# Patient Record
Sex: FEMALE | Race: WHITE | NOT HISPANIC OR LATINO | Employment: UNEMPLOYED | URBAN - NONMETROPOLITAN AREA
[De-identification: names, ages, dates, MRNs, and addresses within clinical notes are randomized per-mention and may not be internally consistent; named-entity substitution may affect disease eponyms.]

---
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
## 2021-01-09 ENCOUNTER — APPOINTMENT (OUTPATIENT)
Dept: CT IMAGING | Facility: HOSPITAL | Age: 21
End: 2021-01-09

## 2021-01-09 ENCOUNTER — HOSPITAL ENCOUNTER (EMERGENCY)
Facility: HOSPITAL | Age: 21
Discharge: HOME | End: 2021-01-09
Attending: EMERGENCY MEDICINE | Admitting: EMERGENCY MEDICINE

## 2021-01-09 VITALS
WEIGHT: 140 LBS | HEIGHT: 62 IN | BODY MASS INDEX: 25.76 KG/M2 | RESPIRATION RATE: 18 BRPM | SYSTOLIC BLOOD PRESSURE: 109 MMHG | OXYGEN SATURATION: 98 % | DIASTOLIC BLOOD PRESSURE: 63 MMHG | HEART RATE: 105 BPM | TEMPERATURE: 98.5 F

## 2021-01-09 DIAGNOSIS — S16.1XXA STRAIN OF NECK MUSCLE, INITIAL ENCOUNTER: Primary | ICD-10-CM

## 2021-01-09 DIAGNOSIS — R51.9 SINUS HEADACHE: ICD-10-CM

## 2021-01-09 PROCEDURE — 70450 CT HEAD/BRAIN W/O DYE: CPT

## 2021-01-09 PROCEDURE — 72125 CT NECK SPINE W/O DYE: CPT

## 2021-01-09 PROCEDURE — 99283 EMERGENCY DEPT VISIT LOW MDM: CPT

## 2021-01-09 PROCEDURE — 63710000001 ONDANSETRON ODT 4 MG TABLET DISPERSIBLE: Performed by: EMERGENCY MEDICINE

## 2021-01-09 RX ORDER — FLUTICASONE PROPIONATE 50 MCG
2 SPRAY, SUSPENSION (ML) NASAL DAILY
Qty: 15.8 ML | Refills: 0 | Status: SHIPPED | OUTPATIENT
Start: 2021-01-09

## 2021-01-09 RX ORDER — ONDANSETRON 4 MG/1
4 TABLET, ORALLY DISINTEGRATING ORAL ONCE
Status: COMPLETED | OUTPATIENT
Start: 2021-01-09 | End: 2021-01-09

## 2021-01-09 RX ORDER — KETOROLAC TROMETHAMINE 10 MG/1
10 TABLET, FILM COATED ORAL EVERY 6 HOURS PRN
Status: DISCONTINUED | OUTPATIENT
Start: 2021-01-09 | End: 2021-01-09 | Stop reason: HOSPADM

## 2021-01-09 RX ORDER — LORAZEPAM 1 MG/1
1 TABLET ORAL ONCE
Status: COMPLETED | OUTPATIENT
Start: 2021-01-09 | End: 2021-01-09

## 2021-01-09 RX ADMIN — ONDANSETRON 4 MG: 4 TABLET, ORALLY DISINTEGRATING ORAL at 01:14

## 2021-01-09 RX ADMIN — LORAZEPAM 1 MG: 1 TABLET ORAL at 01:14

## 2021-01-09 NOTE — ED PROVIDER NOTES
Subjective   20-year-old female presents to the ED with complaint of headache and neck pain.  Onset of pain yesterday.  She describes neck pain that begins in the bilateral paraspinous lower cervical region and radiates up to the back of her head.  She also has pain on the left side of her forehead, throbbing in nature.  Associated nausea and photophobia.  Has not had similar headaches in the past.  She has nausea but no vomiting.  No change in her mental status, no fever, no cough congestion sore throat runny nose, no change in smell or taste sensation.  Symptoms moderate severity with no aggravating or alleviating factors.      History provided by:  Patient and caregiver      Review of Systems   All other systems reviewed and are negative.      No past medical history on file.    No Known Allergies    No past surgical history on file.    No family history on file.    Social History     Socioeconomic History   • Marital status: Single     Spouse name: Not on file   • Number of children: Not on file   • Years of education: Not on file   • Highest education level: Not on file           Objective   Physical Exam  Vitals signs and nursing note reviewed.   Constitutional:       General: She is not in acute distress.     Appearance: Normal appearance. She is normal weight.   HENT:      Head: Normocephalic and atraumatic.      Nose: Nose normal.      Mouth/Throat:      Mouth: Mucous membranes are moist.      Pharynx: Oropharynx is clear. No oropharyngeal exudate or posterior oropharyngeal erythema.   Eyes:      Extraocular Movements: Extraocular movements intact.      Conjunctiva/sclera: Conjunctivae normal.      Pupils: Pupils are equal, round, and reactive to light.   Neck:      Musculoskeletal: Normal range of motion and neck supple. No neck rigidity or muscular tenderness.   Cardiovascular:      Rate and Rhythm: Normal rate and regular rhythm.      Pulses: Normal pulses.      Heart sounds: Normal heart sounds.    Pulmonary:      Effort: Pulmonary effort is normal.      Breath sounds: Normal breath sounds. No wheezing, rhonchi or rales.   Abdominal:      General: Abdomen is flat. Bowel sounds are normal. There is no distension.      Palpations: Abdomen is soft.      Tenderness: There is no abdominal tenderness.   Musculoskeletal: Normal range of motion.         General: No tenderness.      Right lower leg: No edema.      Left lower leg: No edema.   Skin:     General: Skin is warm and dry.      Capillary Refill: Capillary refill takes less than 2 seconds.      Findings: No rash.   Neurological:      General: No focal deficit present.      Mental Status: She is alert and oriented to person, place, and time. Mental status is at baseline.      Cranial Nerves: No cranial nerve deficit.      Sensory: No sensory deficit.      Motor: No weakness.   Psychiatric:         Mood and Affect: Mood normal.         Behavior: Behavior normal.         Thought Content: Thought content normal.         Procedures           ED Course  ED Course as of Jan 09 0331   Sat Jan 09, 2021   0328 Patient is feeling better.  CT head shows left frontal sinusitis which explains location of the patient's left frontal headache.  Her occipital headache and neck pain are likely secondary to musculoskeletal strain.  S per mom, looking down at her computer and phone for hours and hours a day.  Recommend she adjust her screen height to eye level, can take over-the-counter NSAIDs.    [AW]      ED Course User Index  [AW] Segun Harvey MD                                           [facility]    Final diagnoses:   Strain of neck muscle, initial encounter   Sinus headache            Segun Harvey MD  01/09/21 5103

## 2021-01-09 NOTE — ED NOTES
Patient presents to the ED with her legal guardian and the CC of nausea and headache X2 days. Patient was given 200 mg of motrin at 2300 on 1/8/21 without relief. She also c/o of neck pain, but states it has been going on her entire life.      Sherine Falcon, RN  01/09/21 0048

## 2021-01-13 ENCOUNTER — OFFICE VISIT (OUTPATIENT)
Age: 21
End: 2021-01-13

## 2021-01-13 VITALS — OXYGEN SATURATION: 99 % | TEMPERATURE: 98.4 F | HEART RATE: 107 BPM

## 2021-01-13 DIAGNOSIS — Z11.59 SCREENING FOR VIRAL DISEASE: Primary | ICD-10-CM

## 2021-01-13 PROCEDURE — 99999 PR OFFICE/OUTPT VISIT,PROCEDURE ONLY: CPT | Performed by: NURSE PRACTITIONER

## 2021-01-19 LAB — SARS-COV-2, NAA: NOT DETECTED

## 2021-01-31 ENCOUNTER — HOSPITAL ENCOUNTER (EMERGENCY)
Age: 21
Discharge: HOME | End: 2021-02-01
Attending: PEDIATRICS
Payer: OTHER GOVERNMENT

## 2021-01-31 DIAGNOSIS — G43.809 OTHER MIGRAINE WITHOUT STATUS MIGRAINOSUS, NOT INTRACTABLE: Primary | ICD-10-CM

## 2021-01-31 PROCEDURE — 96374 THER/PROPH/DIAG INJ IV PUSH: CPT

## 2021-01-31 PROCEDURE — 96375 TX/PRO/DX INJ NEW DRUG ADDON: CPT

## 2021-01-31 PROCEDURE — 99284 EMERGENCY DEPT VISIT MOD MDM: CPT

## 2021-02-01 VITALS
WEIGHT: 130 LBS | HEART RATE: 94 BPM | OXYGEN SATURATION: 99 % | HEIGHT: 61 IN | DIASTOLIC BLOOD PRESSURE: 79 MMHG | BODY MASS INDEX: 24.55 KG/M2 | TEMPERATURE: 97.9 F | RESPIRATION RATE: 16 BRPM | SYSTOLIC BLOOD PRESSURE: 107 MMHG

## 2021-02-01 LAB
ADENOVIRUS BY PCR: NOT DETECTED
BORDETELLA PARAPERTUSSIS BY PCR: NOT DETECTED
BORDETELLA PERTUSSIS BY PCR: NOT DETECTED
CHLAMYDOPHILIA PNEUMONIAE BY PCR: NOT DETECTED
CORONAVIRUS 229E BY PCR: NOT DETECTED
CORONAVIRUS HKU1 BY PCR: NOT DETECTED
CORONAVIRUS NL63 BY PCR: NOT DETECTED
CORONAVIRUS OC43 BY PCR: NOT DETECTED
HUMAN METAPNEUMOVIRUS BY PCR: NOT DETECTED
HUMAN RHINOVIRUS/ENTEROVIRUS BY PCR: NOT DETECTED
INFLUENZA A BY PCR: NOT DETECTED
INFLUENZA B BY PCR: NOT DETECTED
MYCOPLASMA PNEUMONIAE BY PCR: NOT DETECTED
PARAINFLUENZA VIRUS 1 BY PCR: NOT DETECTED
PARAINFLUENZA VIRUS 2 BY PCR: NOT DETECTED
PARAINFLUENZA VIRUS 3 BY PCR: NOT DETECTED
PARAINFLUENZA VIRUS 4 BY PCR: NOT DETECTED
RESPIRATORY SYNCYTIAL VIRUS BY PCR: NOT DETECTED
SARS-COV-2, PCR: NOT DETECTED

## 2021-02-01 PROCEDURE — 2580000003 HC RX 258: Performed by: PEDIATRICS

## 2021-02-01 PROCEDURE — 6360000002 HC RX W HCPCS: Performed by: PEDIATRICS

## 2021-02-01 PROCEDURE — 0202U NFCT DS 22 TRGT SARS-COV-2: CPT

## 2021-02-01 RX ORDER — DIPHENHYDRAMINE HYDROCHLORIDE 50 MG/ML
50 INJECTION INTRAMUSCULAR; INTRAVENOUS ONCE
Status: COMPLETED | OUTPATIENT
Start: 2021-02-01 | End: 2021-02-01

## 2021-02-01 RX ORDER — 0.9 % SODIUM CHLORIDE 0.9 %
1000 INTRAVENOUS SOLUTION INTRAVENOUS ONCE
Status: COMPLETED | OUTPATIENT
Start: 2021-02-01 | End: 2021-02-01

## 2021-02-01 RX ORDER — METOCLOPRAMIDE HYDROCHLORIDE 5 MG/ML
10 INJECTION INTRAMUSCULAR; INTRAVENOUS ONCE
Status: COMPLETED | OUTPATIENT
Start: 2021-02-01 | End: 2021-02-01

## 2021-02-01 RX ORDER — KETOROLAC TROMETHAMINE 30 MG/ML
15 INJECTION, SOLUTION INTRAMUSCULAR; INTRAVENOUS ONCE
Status: COMPLETED | OUTPATIENT
Start: 2021-02-01 | End: 2021-02-01

## 2021-02-01 RX ORDER — METOCLOPRAMIDE 10 MG/1
10 TABLET ORAL EVERY 6 HOURS PRN
Qty: 20 TABLET | Refills: 0 | Status: SHIPPED | OUTPATIENT
Start: 2021-02-01

## 2021-02-01 RX ADMIN — KETOROLAC TROMETHAMINE 15 MG: 30 INJECTION, SOLUTION INTRAMUSCULAR; INTRAVENOUS at 01:50

## 2021-02-01 RX ADMIN — SODIUM CHLORIDE 1000 ML: 9 INJECTION, SOLUTION INTRAVENOUS at 01:45

## 2021-02-01 RX ADMIN — METOCLOPRAMIDE 10 MG: 5 INJECTION, SOLUTION INTRAMUSCULAR; INTRAVENOUS at 01:54

## 2021-02-01 RX ADMIN — DIPHENHYDRAMINE HYDROCHLORIDE 50 MG: 50 INJECTION, SOLUTION INTRAMUSCULAR; INTRAVENOUS at 01:50

## 2021-02-01 ASSESSMENT — PAIN SCALES - GENERAL
PAINLEVEL_OUTOF10: 0
PAINLEVEL_OUTOF10: 7

## 2021-02-01 NOTE — ED NOTES
Pt states that she has been vomiting/nauseated for about a week now. Mom states that she has not been able to keep much food or water down. Labs sent at this time. Pt made aware that urine specimen will be needed.       Kai Loyola RN  01/31/21 7079

## 2021-02-02 ASSESSMENT — ENCOUNTER SYMPTOMS
SHORTNESS OF BREATH: 0
ABDOMINAL PAIN: 0
NAUSEA: 1
COUGH: 0
COLOR CHANGE: 0
BACK PAIN: 0
VOMITING: 0
RHINORRHEA: 1

## 2021-02-02 NOTE — ED PROVIDER NOTES
color change and pallor. Neurological: Positive for headaches. Negative for syncope. Psychiatric/Behavioral: Negative for agitation and confusion. All other systems reviewed and are negative. PAST MEDICALHISTORY     Past Medical History:   Diagnosis Date    ADD (attention deficit disorder)     ADHD     Bipolar 1 disorder ([facility] [address]          SURGICAL HISTORY     History reviewed. No pertinent surgical history. CURRENT MEDICATIONS     Discharge Medication List as of 2/1/2021  2:49 AM      CONTINUE these medications which have NOT CHANGED    Details   citalopram (CELEXA) 10 MG tablet Take 1 tablet by mouth daily, Disp-30 tablet, R-0Normal      ARIPiprazole (ABILIFY) 10 MG tablet Take 10 mg by mouth nightly Historical Med      NONFORMULARY A hormone, pt grandma is not sure what kind it is. Historical Med      vitamin D (ERGOCALCIFEROL) 78828 units capsule Take 1 capsule by mouth once a week for 11 doses, Disp-11 capsule, R-0Normal             ALLERGIES     Patient has no known allergies. FAMILY HISTORY     History reviewed. No pertinent family history.        SOCIAL HISTORY       Social History     Socioeconomic History    Marital status: Single     Spouse name: None    Number of children: None    Years of education: None    Highest education level: None   Occupational History    None   Social Needs    Financial resource strain: None    Food insecurity     Worry: None     Inability: None    Transportation needs     Medical: None     Non-medical: None   Tobacco Use    Smoking status: Never Smoker    Smokeless tobacco: Never Used   Substance and Sexual Activity    Alcohol use: Never     Frequency: Never    Drug use: Never    Sexual activity: Never   Lifestyle    Physical activity     Days per week: None     Minutes per session: None    Stress: None   Relationships    Social connections     Talks on phone: None     Gets together: None     Attends [employer] service: None     Active member of club or organization: None     Attends meetings of clubs or organizations: None     Relationship status: None    Intimate partner violence     Fear of current or ex partner: None     Emotionally abused: None     Physically abused: None     Forced sexual activity: None   Other Topics Concern    None   Social History Narrative    None       SCREENINGS    [place] Coma Scale  Eye Opening: Spontaneous  Best Verbal Response: Oriented  Best Motor Response: Obeys commands  [place] Coma Scale Score: 15        PHYSICAL EXAM    (up to 7 for level 4, 8 or more for level 5)     ED Triage Vitals [01/31/21 2258]   BP Temp Temp Source Pulse Resp SpO2 Height Weight   118/88 97.9 °F (36.6 °C) Oral 102 18 99 % 5' 1\" (1.549 m) 130 lb (59 kg)       Physical Exam  Vitals signs and nursing note reviewed. Constitutional:       General: She is not in acute distress. Appearance: Normal appearance. HENT:      Head: Normocephalic and atraumatic. Right Ear: External ear normal.      Left Ear: External ear normal.      Nose: Nose normal.      Mouth/Throat:      Mouth: Mucous membranes are moist.      Pharynx: Oropharynx is clear. No oropharyngeal exudate. Eyes:      General: No scleral icterus. Conjunctiva/sclera: Conjunctivae normal.      Pupils: Pupils are equal, round, and reactive to light. Neck:      Musculoskeletal: Neck supple. No neck rigidity. Cardiovascular:      Rate and Rhythm: Normal rate and regular rhythm. Pulses: Normal pulses. Heart sounds: Normal heart sounds. Pulmonary:      Effort: Pulmonary effort is normal.      Breath sounds: Normal breath sounds. Abdominal:      General: Bowel sounds are normal.      Palpations: Abdomen is soft. Tenderness: There is no abdominal tenderness. There is no guarding. Musculoskeletal:         General: No tenderness or deformity. Skin:     General: Skin is warm and dry. Capillary Refill: Capillary refill takes less than 2 seconds. Coloration: Skin is not jaundiced. Neurological:      General: No focal deficit present. Mental Status: She is alert and oriented to person, place, and time. Mental status is at baseline. Cranial Nerves: No cranial nerve deficit. Sensory: No sensory deficit. Motor: No weakness. Coordination: Coordination normal.      Gait: Gait normal.   Psychiatric:         Mood and Affect: Mood normal.         Behavior: Behavior normal.         DIAGNOSTIC RESULTS     RADIOLOGY:  Non-plain film images such as CT, Ultrasound and MRI are read by the radiologist. Plain radiographic images are visualized and preliminarily interpreted bythe emergency physician with the below findings:      No orders to display           LABS:  Labs Reviewed   RESPIRATORY PANEL, MOLECULAR, WITH COVID-19       All other labs were within normal range or not returned as of this dictation. EMERGENCY DEPARTMENT COURSE and DIFFERENTIAL DIAGNOSIS/MDM:   Vitals:    Vitals:    01/31/21 2258 02/01/21 0026 02/01/21 0232 02/01/21 0300   BP: 118/88  107/79 107/79   Pulse: 102 102 97 94   Resp: 18  15 16   Temp: 97.9 °F (36.6 °C)   97.9 °F (36.6 °C)   TempSrc: Oral      SpO2: 99%  99%    Weight: 130 lb (59 kg)      Height: 5' 1\" (1.549 m)          MDM  66-year-old female presents to the emergency department with headache and vomiting. Patient and mother requested Covid testing which was negative. Patient received IV fluids, Reglan, Benadryl, and Toradol with good relief of pain and nausea. Etiology may be secondary to migraine headache, especially with recent history of irregular menses. Patient will follow up with Dr. Charisma Zavala, primary care provider, and Dr. Theodore De Los Santos, neurologist.  Patient will return with increasing or severe pain, persistent vomiting, or other concerns. CONSULTS:  None    PROCEDURES:  Unless otherwise noted below, none     Procedures    FINAL IMPRESSION      1.  Other migraine without status migrainosus, not intractable          DISPOSITION/PLAN   DISPOSITION Decision To Discharge 02/01/2021 02:45:12 AM      PATIENT REFERRED TO:  Shiloh Truong MD  [address] [facility]  [phone]    Schedule an appointment as soon as possible for a visit       Claude Bologna, MD  [address] [facility] [address] [facility]    Schedule an appointment as soon as possible for a visit         DISCHARGE MEDICATIONS:  Discharge Medication List as of 2/1/2021  2:49 AM      START taking these medications    Details   metoclopramide (REGLAN) 10 MG tablet Take 1 tablet by mouth every 6 hours as needed (headache, nausea or vomiting) May cause drowsiness or diarrhea., Disp-20 tablet, R-0Print                (Please note that portions of this note were completed with a voice recognition program.  Efforts were made to edit thedictations but occasionally words are mis-transcribed.)    Harsh Bender MD (electronically signed)  Attending Emergency Physician          Harsh Bender MD  02/02/21 1853

## 2021-03-23 ENCOUNTER — HOSPITAL ENCOUNTER (EMERGENCY)
Age: 21
Discharge: HOME | End: 2021-03-24
Payer: MEDICAID

## 2021-03-23 VITALS
SYSTOLIC BLOOD PRESSURE: 112 MMHG | HEIGHT: 65 IN | DIASTOLIC BLOOD PRESSURE: 77 MMHG | BODY MASS INDEX: 22.33 KG/M2 | RESPIRATION RATE: 20 BRPM | WEIGHT: 134 LBS | TEMPERATURE: 98.2 F | HEART RATE: 78 BPM | OXYGEN SATURATION: 98 %

## 2021-03-23 DIAGNOSIS — Z20.822 COVID-19 VIRUS NOT DETECTED: Primary | ICD-10-CM

## 2021-03-23 LAB — SARS-COV-2, NAAT: NOT DETECTED

## 2021-03-23 PROCEDURE — 99283 EMERGENCY DEPT VISIT LOW MDM: CPT

## 2021-03-23 PROCEDURE — 87635 SARS-COV-2 COVID-19 AMP PRB: CPT

## 2021-03-23 RX ORDER — DEXTROMETHORPHAN HYDROBROMIDE AND PROMETHAZINE HYDROCHLORIDE 15; 6.25 MG/5ML; MG/5ML
5 SYRUP ORAL 4 TIMES DAILY PRN
Qty: 118 ML | Refills: 0 | Status: SHIPPED | OUTPATIENT
Start: 2021-03-23 | End: 2021-03-30

## 2021-03-23 RX ORDER — NORETHINDRONE ACETATE AND ETHINYL ESTRADIOL 1MG-20(21)
1 KIT ORAL DAILY
COMMUNITY
End: 2022-07-17

## 2021-03-23 ASSESSMENT — ENCOUNTER SYMPTOMS
COUGH: 0
VOMITING: 0

## 2021-03-24 NOTE — ED PROVIDER NOTES
[facility]  eMERGENCY dEPARTMENT eNCOUnter      Pt Name: Sumeet Yanez  MRN: [id number]  [place] 2000  Date of evaluation: 3/23/2021  Provider: Manuel Ghotra, [address]       Chief Complaint   Patient presents with    Concern For COVID-19         HISTORY OF PRESENT ILLNESS   (Location/Symptom, Timing/Onset,Context/Setting, Quality, Duration, Modifying Factors, Severity)  Note limiting factors. Sumeet Yanez a 21 y.o. female who presents to the emergency department for evaluation of concern for covid. Pt tells me that she has been in close contact with mom and boyfriend who have had cough and congestion. Mom tells me that pt has had close contact with family member in [place] who was diagnosed with covid. Pt denies fevers as well as constitutional symptoms of illness. [facility]    Nursing Notes were reviewed. REVIEW OF SYSTEMS    (2-9 systems for level 4, 10 or more for level 5)     Review of Systems   Constitutional: Negative for fever. Respiratory: Negative for cough. Gastrointestinal: Negative for vomiting. A complete review of systems was performed and is negative except as noted above in the HPI. PAST MEDICAL HISTORY     Past Medical History:   Diagnosis Date    ADD (attention deficit disorder)     ADHD     Bipolar 1 disorder ([facility] [address]          SURGICAL HISTORY     No past surgical history on file. CURRENT MEDICATIONS       Previous Medications    METOCLOPRAMIDE (REGLAN) 10 MG TABLET    Take 1 tablet by mouth every 6 hours as needed (headache, nausea or vomiting) May cause drowsiness or diarrhea. NORETHINDRONE-ETHINYL ESTRADIOL (JUNEL FE 1/20) 1-20 MG-MCG PER TABLET    Take 1 tablet by mouth daily       ALLERGIES     Patient has no known allergies. FAMILY HISTORY     No family history on file.        SOCIAL HISTORY       Social History     Socioeconomic History    Marital status: Single     Spouse name: Not on file    Number of children: Not on file    Years of education: Not on file    Highest education level: Not on file   Occupational History    Not on file   Social Needs    Financial resource strain: Not on file    Food insecurity     Worry: Not on file     Inability: Not on file    Transportation needs     Medical: Not on file     Non-medical: Not on file   Tobacco Use    Smoking status: Never Smoker    Smokeless tobacco: Never Used   Substance and Sexual Activity    Alcohol use: Never     Frequency: Never    Drug use: Never    Sexual activity: Never   Lifestyle    Physical activity     Days per week: Not on file     Minutes per session: Not on file    Stress: Not on file   Relationships    Social connections     Talks on phone: Not on file     Gets together: Not on file     Attends [employer] service: Not on file     Active member of club or organization: Not on file     Attends meetings of clubs or organizations: Not on file     Relationship status: Not on file    Intimate partner violence     Fear of current or ex partner: Not on file     Emotionally abused: Not on file     Physically abused: Not on file     Forced sexual activity: Not on file   Other Topics Concern    Not on file   Social History Narrative    Not on file       SCREENINGS             PHYSICAL EXAM    (up to 7 for level 4, 8 or more for level 5)     ED Triage Vitals [03/23/21 2117]   BP Temp Temp Source Pulse Resp SpO2 Height Weight   112/77 98.2 °F (36.8 °C) Oral 78 20 98 % 5' 5\" (1.651 m) 134 lb (60.8 kg)       Physical Exam  Vitals signs reviewed. Constitutional:       Comments: Nontoxic, well hydrated appearing 21 yr old female   HENT:      Head: Normocephalic. Right Ear: External ear normal.      Left Ear: External ear normal.      Mouth/Throat:      Mouth: Mucous membranes are moist.      Pharynx: Oropharynx is clear. Eyes:      Conjunctiva/sclera: Conjunctivae normal.      Pupils: Pupils are equal, round, and reactive to light.    Neck: Medication List          (Pleasenote that portions of this note were completed with a voice recognition program.  Efforts were made to edit the dictations but occasionally words are mis-transcribed.)              Cher Correa, ODESSA  03/23/21 4748

## 2021-03-31 ENCOUNTER — TELEPHONE (OUTPATIENT)
Dept: PSYCHIATRY | Age: 21
End: 2021-03-31

## 2021-03-31 NOTE — TELEPHONE ENCOUNTER
Called pt to remind them about their appt tomorrow on 4-1. Pts mother states she is having an upper GI surgery tomorrow and doesn't know if she will feel up to doing that appt. Rescheduled her for 4-2.     Electronically signed by Jaja Stout MA on 3/31/2021 at 12:00 PM

## 2021-04-01 ENCOUNTER — TELEPHONE (OUTPATIENT)
Dept: PSYCHIATRY | Age: 21
End: 2021-04-01

## 2021-04-01 ENCOUNTER — HOSPITAL ENCOUNTER (OUTPATIENT)
Dept: GENERAL RADIOLOGY | Age: 21
Discharge: HOME | End: 2021-04-01
Payer: MEDICAID

## 2021-04-01 DIAGNOSIS — R11.10 VOMITING, INTRACTABILITY OF VOMITING NOT SPECIFIED, PRESENCE OF NAUSEA NOT SPECIFIED, UNSPECIFIED VOMITING TYPE: ICD-10-CM

## 2021-04-01 PROCEDURE — 74240 X-RAY XM UPR GI TRC 1CNTRST: CPT

## 2021-04-01 NOTE — TELEPHONE ENCOUNTER
Called pt to remind them about their appt tomorrow 4-2.     Electronically signed by Prashant Sloan MA on 4/1/2021 at 2:57 PM

## 2021-04-16 ENCOUNTER — TELEPHONE (OUTPATIENT)
Dept: PSYCHIATRY | Age: 21
End: 2021-04-16

## 2021-04-16 NOTE — TELEPHONE ENCOUNTER
Called pt to check on them since she missed her appt today. No answer and left VM.     Electronically signed by Abhilash Walker MA on 4/16/2021 at 11:18 AM

## 2021-04-19 RX ORDER — NORETHINDRONE ACETATE/ETHINYL ESTRADIOL 1MG-20MCG
KIT ORAL
Qty: 21 TABLET | Refills: 11 | OUTPATIENT
Start: 2021-04-19

## 2021-05-03 ENCOUNTER — OFFICE VISIT (OUTPATIENT)
Dept: PSYCHIATRY | Age: 21
End: 2021-05-03
Payer: OTHER GOVERNMENT

## 2021-05-03 DIAGNOSIS — F39 MOOD DISORDER (HCC): Primary | ICD-10-CM

## 2021-05-03 PROCEDURE — 90791 PSYCH DIAGNOSTIC EVALUATION: CPT | Performed by: SOCIAL WORKER

## 2021-05-03 ASSESSMENT — COLUMBIA-SUICIDE SEVERITY RATING SCALE - C-SSRS
2. HAVE YOU ACTUALLY HAD ANY THOUGHTS OF KILLING YOURSELF?: NO
1. WITHIN THE PAST MONTH, HAVE YOU WISHED YOU WERE DEAD OR WISHED YOU COULD GO TO SLEEP AND NOT WAKE UP?: YES

## 2021-05-03 ASSESSMENT — ANXIETY QUESTIONNAIRES
GAD7 TOTAL SCORE: 18
6. BECOMING EASILY ANNOYED OR IRRITABLE: 3-NEARLY EVERY DAY
4. TROUBLE RELAXING: 3-NEARLY EVERY DAY
5. BEING SO RESTLESS THAT IT IS HARD TO SIT STILL: 3-NEARLY EVERY DAY
2. NOT BEING ABLE TO STOP OR CONTROL WORRYING: 3-NEARLY EVERY DAY

## 2021-05-03 ASSESSMENT — PATIENT HEALTH QUESTIONNAIRE - PHQ9
7. TROUBLE CONCENTRATING ON THINGS, SUCH AS READING THE NEWSPAPER OR WATCHING TELEVISION: 0
9. THOUGHTS THAT YOU WOULD BE BETTER OFF DEAD, OR OF HURTING YOURSELF: 1
4. FEELING TIRED OR HAVING LITTLE ENERGY: 3
2. FEELING DOWN, DEPRESSED OR HOPELESS: 2
5. POOR APPETITE OR OVEREATING: 3
3. TROUBLE FALLING OR STAYING ASLEEP: 3
6. FEELING BAD ABOUT YOURSELF - OR THAT YOU ARE A FAILURE OR HAVE LET YOURSELF OR YOUR FAMILY DOWN: 3

## 2021-05-03 NOTE — LETTER
[address] [facility]  [address] [place] [address]  [address] [address]  Phone: [phone]  Fax: [phone]    Nitesh Mary [facility]        May 3, 2021     Patient: Isabelle Hutson   YOB: 2000   Date of Visit: 5/3/2021       To Whom it May Concern:    Isabelle Hutson was seen in my clinic on 5/3/2021. If you have any questions or concerns, please don't hesitate to call.     Sincerely,         Giuseppe Simmons LCSW
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
## 2021-05-03 NOTE — PATIENT INSTRUCTIONS
[place] [address]      Emergency Services Phone: CRISIS LINE: [phone]    Making the environment safe: How can I make my environment (house/apartment/living space) safer? For example, can I remove guns, medications, and other items? 1. Remove any guns/weapons from home.    2.   Remove or lock up extra medications in a secure location

## 2021-05-06 ENCOUNTER — TELEPHONE (OUTPATIENT)
Dept: PSYCHIATRY | Age: 21
End: 2021-05-06

## 2021-05-06 NOTE — TELEPHONE ENCOUNTER
Called pt to remind them about their appt. Tomorrow 5/7. No answer and vm is full.     Electronically signed by Samantha Gómez MA on 5/6/2021 at 10:48 AM

## 2021-05-17 ENCOUNTER — TELEPHONE (OUTPATIENT)
Dept: PSYCHIATRY | Age: 21
End: 2021-05-17

## 2021-05-18 ENCOUNTER — OFFICE VISIT (OUTPATIENT)
Dept: PSYCHIATRY | Age: 21
End: 2021-05-18
Payer: OTHER GOVERNMENT

## 2021-05-18 VITALS
HEART RATE: 89 BPM | WEIGHT: 133.6 LBS | DIASTOLIC BLOOD PRESSURE: 68 MMHG | HEIGHT: 62 IN | BODY MASS INDEX: 24.59 KG/M2 | SYSTOLIC BLOOD PRESSURE: 109 MMHG | OXYGEN SATURATION: 98 % | TEMPERATURE: 99.1 F

## 2021-05-18 DIAGNOSIS — F39 MOOD DISORDER (HCC): Primary | ICD-10-CM

## 2021-05-18 PROCEDURE — 99215 OFFICE O/P EST HI 40 MIN: CPT | Performed by: NURSE PRACTITIONER

## 2021-05-18 RX ORDER — PRAZOSIN HYDROCHLORIDE 1 MG/1
1 CAPSULE ORAL NIGHTLY
Qty: 30 CAPSULE | Refills: 3 | Status: SHIPPED | OUTPATIENT
Start: 2021-05-18 | End: 2021-10-04

## 2021-05-18 RX ORDER — HYDROXYZINE HYDROCHLORIDE 25 MG/1
25 TABLET, FILM COATED ORAL NIGHTLY
Qty: 30 TABLET | Refills: 0 | Status: SHIPPED | OUTPATIENT
Start: 2021-05-18 | End: 2021-06-01

## 2021-05-18 RX ORDER — LAMOTRIGINE 25 MG/1
25 TABLET ORAL DAILY
Qty: 30 TABLET | Refills: 3 | Status: SHIPPED | OUTPATIENT
Start: 2021-05-18 | End: 2021-06-01

## 2021-05-18 RX ORDER — OMEPRAZOLE 20 MG/1
CAPSULE, DELAYED RELEASE ORAL
COMMUNITY
Start: 2021-04-15
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
## 2021-05-21 ENCOUNTER — TELEPHONE (OUTPATIENT)
Dept: PSYCHIATRY | Age: 21
End: 2021-05-21

## 2021-05-21 NOTE — TELEPHONE ENCOUNTER
Called pt for appointment reminder.   -Pt asked to reschedule and was rescheduled 7/1. Pt's mother has double booked appts.     Electronically signed by Alecia Khanna MA on 5/21/2021 at 11:28 AM

## 2021-06-01 ENCOUNTER — VIRTUAL VISIT (OUTPATIENT)
Dept: PSYCHIATRY | Age: 21
End: 2021-06-01
Payer: OTHER GOVERNMENT

## 2021-06-01 DIAGNOSIS — F39 MOOD DISORDER (HCC): Primary | ICD-10-CM

## 2021-06-01 PROCEDURE — 99442 PR PHYS/QHP TELEPHONE EVALUATION 11-20 MIN: CPT | Performed by: NURSE PRACTITIONER

## 2021-06-01 RX ORDER — LAMOTRIGINE 25 MG/1
50 TABLET ORAL DAILY
Qty: 60 TABLET | Refills: 3 | Status: SHIPPED | OUTPATIENT
Start: 2021-06-01 | End: 2021-06-30

## 2021-06-01 RX ORDER — HYDROXYZINE HYDROCHLORIDE 25 MG/1
25 TABLET, FILM COATED ORAL 3 TIMES DAILY PRN
Qty: 30 TABLET | Refills: 2 | Status: SHIPPED | OUTPATIENT
Start: 2021-06-01 | End: 2021-10-04

## 2021-06-01 NOTE — PROGRESS NOTES
Khoi Aldana is a 21 y.o. female evaluated via telephone on 6/1/2021. Consent:  She and/or health care decision maker is aware that that she may receive a bill for this telephone service, depending on her insurance coverage, and has provided verbal consent to proceed: Yes      Documentation:  I communicated with the patient and/or health care decision maker about See Below. Details of this discussion including any medical advice provided: See Below      I affirm this is a Patient Initiated Episode with a Patient who has not had a related appointment within my department in the past 7 days or scheduled within the next 24 hours. Patient identification was verified at the start of the visit: Yes    Total Time: minutes: 11-20 minutes    Note: not billable if this call serves to triage the patient into an appointment for the relevant concern      ODESSA Harrington CNP      6/1/21        Progress Note    Khoi Aldana 2000      Chief Complaint   Patient presents with    Medication Check         Subjective:    Patient is a 21 y.o. female diagnosed with mood disorder and presents today for follow-up. Last seen in clinic on 5/18/2021  and prior records were reviewed. Last visit: Patient also reported having panic attacks as well as having in outbursts daily. Patient stated that she has trouble sleeping and often has nightmares. She lives with her mother, her mothers boyfriend and patients boyfriend. He is a big support person in her life. He moved in in march. He moved from [place] to be with her. She endorses temper tantrums mood swings low energy high energy agitation irritability poor concentration poor impulse control. She stated that she has increased sex drive. We discussed beginning Lamictal 25 mg for mood stabilization, Atarax 25 mg 3 times a day as needed for breakthrough anxiety, and Minipress 1 mg at bedtime for PTSD and nightmares.   Patient is agreeable to medication regimen. Discussed side effects including but not limited to benign rash, sedation, insomnia, nausea,  rare serious rash and activation of suicidal thoughts. Patient was educated that if a rash or suicidal thoughts develop to come to the ER for assistance, pt verbalized understanding. Today patient states, \"good. Mood is improving. Denies side effects, denies rash. Outbursts have decreased. Appetite is ok. Relationships are doing ok. Anxiety is doing really well, still having anxiety at times through the day but atarax is helpful. Today we discussed increasing her Lamictal to 50 mg daily for mood stability and continuing with therapy. Pt is agreeable. Pt sounded upbeat and cheerful on the phone. Pt was on speaker phone with mother, mother stated that patient was improving on medication. Absent  suicidal ideation. Reports compliance with medications as good . Sleep: better. She is doing better. Not clinching her hands as much    Caffeine use: no change    PREVIOUS MED TRIALS  celexa 40 mg  abilify 10    Current Substance Use:   Alcohol: none  Illicit drug use: denies   Marijuana: denies   Tobacco: denies   Vape: denies     BP: There were no vitals taken for this visit.       Review of Systems - 14 point review:  Negative     Constitutional: (fevers, chills, night sweats, wt loss/gain, change in appetite, fatigue, somnolence)    HEENT: (ear pain or discharge, hearing loss, ear ringing, sinus pressure, nosebleed, nasal discharge, sore throat, oral sores, tooth pain, bleeding gums, hoarse voice, neck pain)      Cardiovascular: (HTN, chest pain, elevated cholesterol/lipids, palpitations, leg swelling, leg pain with walking)    Respiratory: (cough, wheezing, snoring, SOB with activity (dyspnea), SOB while lying flat (orthopnea), awakening with severe SOB (paroxysmal nocturnal dyspnea))    Gastrointestinal: (NVD, constipation, abdominal pain, bright red stools, black tarry stools, stool incontinence)     Genitourinary:  (pelvic pain, burning or frequency of urination, urinary urgency, blood in urine incomplete bladder emptying, urinary incontinence, STD; MEN: testicular pain or swelling, erectile dysfunction; WOMEN: LMP, heavy menstrual bleeding (menorrhagia), irregular periods, postmenopausal bleeding, menstrual pain (dymenorrhea, vaginal discharge)    Musculoskeletal: (bone pain/fracture, joint pain or swelling, musle pain)    Integumentary: (rashes, acne, non-healing sores, itching, breast lumps, breast pain, nipple discharge, hair loss)    Neurologic: (HA, muscle weakness, paresthesias (numbness, coldness, crawling or prickling), memory loss, seizure, dizziness)    Psychiatric:  (anxiety, sadness, irritability/anger, insomnia, suicidality)    Endocrine: (heat or cold intolerance, excessive thirst (polydipsia), excessive hunger (polyphagia))    Immune/Allergic: (hives, seasonal or environmental allergies, HIV exposure)    Hematologic/Lymphatic: (lymph node enlargement, easy bleeding or bruising)    History obtained via chart review and patient    PCP is Sophia Brock MD       Current Meds:    Prior to Admission medications    Medication Sig Start Date End Date Taking?  Authorizing Provider   lamoTRIgine (LAMICTAL) 25 MG tablet Take 2 tablets by mouth daily 6/1/21  Yes ODESSA Crocker CNP   hydrOXYzine (ATARAX) 25 MG tablet Take 1 tablet by mouth 3 times daily as needed for Anxiety 6/1/21 7/1/21 Yes ODESSA Elam CNP   omeprazole (PRILOSEC) 20 MG delayed release capsule TAKE ONE CAPSULE BY MOUTH EVERY DAY FOR REFLUX 4/15/21   Historical Provider, MD   prazosin (MINIPRESS) 1 MG capsule Take 1 capsule by mouth nightly 5/18/21   ODESSA Crocker CNP   norethindrone-ethinyl estradiol (JUNEL FE 1/20) 1-20 MG-MCG per tablet Take 1 tablet by mouth daily    Historical Provider, MD   metoclopramide (REGLAN) 10 MG tablet Take 1 tablet by mouth every 6 hours as needed (headache, nausea or vomiting) May cause drowsiness or diarrhea. 2/1/21   Alexi Aguilar MD     Social History     Socioeconomic History    Marital status: Single     Spouse name: Not on file    Number of children: Not on file    Years of education: Not on file    Highest education level: Not on file   Occupational History    Not on file   Tobacco Use    Smoking status: Never Smoker    Smokeless tobacco: Never Used   Substance and Sexual Activity    Alcohol use: Never    Drug use: Never    Sexual activity: Never   Other Topics Concern    Not on file   Social History Narrative    SAFETY PLAN (completed 5/3/21)        A suicide Safety Plan is a document that supports someone when they are having thoughts of suicide. Warning Signs that indicate a suicidal crisis may be developing: What (situations, thoughts, feelings, body sensations, behaviors, etc.) do you experience that lets you know you are beginning to think about suicide? 1. Increased feelings anxiety    2. Running fingers/hands through hair    3. Will be more quiet        Internal Coping Strategies:  What things can I do (relaxation techniques, hobbies, physical activities, etc.) to take my mind off my problems without contacting another person? 1. Writing poetry    2. Reading Fanfic    3. Watching TV        People and social settings that provide distraction: Who can I call or where can I go to distract me? 1. Name: Flori Peterson, boyienalejandra  Phone: In phone    2. Name: Sherryle South, close friend  Phone: In phone     3. Place: None currently              4. Place:         People whom I can ask for help: Who can I call when I need help - for example, friends, family, clergy, someone else? 1. Name: Flori Peterson boyfrienalejandra                 Phone: In phone    2. Name: Mother, Apryl Agosto      Phone: In phone    3. Name: Close friend, Sherryle South       Phone:  In phone        Professionals or [address] [place] [address] I can contact during a crisis: Who can I call for help - for example, my doctor, my psychiatrist, my psychologist, a mental health provider, a suicide hotline? 1. Clinician Name: [address] [facility] Outpatient office  Phone: [phone] Option 3        Clinician Pager or Emergency Contact #: [phone]        2. Clinician Name: Micki Fishman MD Phone: [phone]        Clinician Pager or Emergency Contact #: [phone]        9. Suicide Prevention Lifeline: 2-625-842-TALK (7216)        4. [address] [place] [address] Emergency Services -  for example, [address], [facility] suicide hotline, [facility] Hotline:      [address]: Office number: [phone]        Emergency Services Address: [facility] [address] [place] [address]        Emergency Services Phone: CRISIS LINE: [phone]        Making the environment safe: How can I make my environment (house/apartment/living space) safer? For example, can I remove guns, medications, and other items? Remove any guns/weapons from home. 2.   Remove or lock up extra medications in a secure location     Social Determinants of Health     Financial Resource Strain:     Difficulty of Paying Living Expenses:    Food Insecurity:     Worried About Running Out of Food in the Last Year:     [address] in the Last Year:    Transportation Needs:     Lack of Transportation (Medical):      Lack of Transportation (Non-Medical):    Physical Activity:     Days of Exercise per Week:     Minutes of Exercise per Session:    Stress:     Feeling of Stress :    Social Connections:     Frequency of Communication with Friends and Family:     Frequency of Social Gatherings with Friends and Family:     Attends [employer] Services:     Active Member of Clubs or Organizations:     Attends Club or Organization Meetings:     Marital Status:    Intimate Partner Violence:     Fear of Current or Ex-Partner:     Emotionally Abused:     Physically Abused:     Sexually Abused: MSE:  Appearance: UTO due to phone call   Behavior: Calm, cooperative, and socially appropriate. No psychomotor retardation/agitation appreciated. Speech: Normal in tone, volume, and quality. No slurring, dysarthria or pressured speech noted. Mood: \"good\"   Affect: UTO due to phone call   Thought Process: Appears linear, logical and goal oriented. Causality appears intact. Thought Content: Denies active suicidal and homicidal ideations. No overt delusions or paranoia appreciated. Perceptions: Denies auditory or visual hallucinations at present time. Not responding to internal stimuli. Concentration: Intact. Orientation: to person, place, date, and situation. Language: Intact. Fund of information: Intact. Memory: Recent and remote appear intact. Impulsivity: Limited. Neurovegitative: Fair appetite and sleep. Insight: Fair. Judgment: Fair. Cognition: Can spell \"world\" backwards: Yes                    Can do serial 7's:  Yes    Lab Results   Component Value Date     03/11/2019    K 3.9 03/11/2019     03/11/2019    CO2 26 03/11/2019    BUN 13 03/11/2019    CREATININE 0.7 03/11/2019    GLUCOSE 96 03/11/2019    CALCIUM 9.4 03/11/2019    PROT 8.0 03/11/2019    LABALBU 4.7 03/11/2019    BILITOT 0.5 03/11/2019    ALKPHOS 70 03/11/2019    AST 20 03/11/2019    ALT 20 03/11/2019    LABGLOM >60 03/11/2019     Lab Results   Component Value Date     03/11/2019    K 3.9 03/11/2019     03/11/2019    CO2 26 03/11/2019    BUN 13 03/11/2019    CREATININE 0.7 03/11/2019    GLUCOSE 96 03/11/2019    CALCIUM 9.4 03/11/2019      Lab Results   Component Value Date    CHOL 193 03/14/2019     Lab Results   Component Value Date    TRIG 98 03/14/2019     Lab Results   Component Value Date    HDL 47 (L) 03/14/2019     Lab Results   Component Value Date    LDLCALC 126 03/14/2019     No results found for: LABVLDL, VLDL  No results found for: [facility]  Lab Results   Component Value Date LABA1C 5.4 03/14/2019     No results found for: EAG  Lab Results   Component Value Date    TSH 1.400 03/13/2019     Lab Results   Component Value Date    VITD25 19.9 (L) 03/13/2019     Lab Results   Component Value Date    UPWKXSES91 4295 (H) 03/13/2019      No results found for: FOLATE     Assessment: No diagnosis found. No evidence of acute suicidality, homicidality or psychosis observed. Patient is psychiatrically stable    Plan:    1. Continue   Atarax 25 mg 3 times a day as needed for anxiety  Minipress 1 mg at bedtime for PTSD and nightmares.      Increase  Lamictal to 50 mg for mood stabilization    Start      Discontinue      The risks, benefits, side effects, indications, contraindications, and adverse effects of the medications have been discussed. Yes.  2. The pt has verbalized understanding and has capacity to give informed consent. 3. The Triny Jamil report has been reviewed according to [facility] regulations. 4. Supportive therapy offered. 5. Follow up: Return in about 4 weeks (around 6/29/2021). 6. The patient has been advised to call with any problems. 7. Controlled substance Treatment Plan: NA.  8. The above listed medications have been continued, modifications in meds and other orders/labs as follows:      Orders Placed This Encounter   Medications    lamoTRIgine (LAMICTAL) 25 MG tablet     Sig: Take 2 tablets by mouth daily     Dispense:  60 tablet     Refill:  3    hydrOXYzine (ATARAX) 25 MG tablet     Sig: Take 1 tablet by mouth 3 times daily as needed for Anxiety     Dispense:  30 tablet     Refill:  2      No orders of the defined types were placed in this encounter. 9. Additional comments: Continue therapy, discussed sleep hygiene, discussed the use of coping skills and relaxation strategies to manage symptoms. 10.Over 50% of the total visit time of   15 minutes was spent on counseling and/or coordination of care of:                      No diagnosis found. Psychotherapy Topics: mood/medication effectiveness family and health    Jose Wilson, APRN - CNP    This dictation was generated by voice recognition computer software. Although all attempts are made to edit the dictation for accuracy, there may be errors in the transcription that are not intended.

## 2021-06-30 ENCOUNTER — TELEPHONE (OUTPATIENT)
Dept: PSYCHIATRY | Age: 21
End: 2021-06-30

## 2021-06-30 ENCOUNTER — VIRTUAL VISIT (OUTPATIENT)
Dept: PSYCHIATRY | Age: 21
End: 2021-06-30
Payer: OTHER GOVERNMENT

## 2021-06-30 DIAGNOSIS — F39 MOOD DISORDER (HCC): Primary | ICD-10-CM

## 2021-06-30 PROCEDURE — 99442 PR PHYS/QHP TELEPHONE EVALUATION 11-20 MIN: CPT | Performed by: NURSE PRACTITIONER

## 2021-06-30 RX ORDER — LAMOTRIGINE 150 MG/1
75 TABLET ORAL DAILY
Qty: 15 TABLET | Refills: 2 | Status: SHIPPED | OUTPATIENT
Start: 2021-06-30 | End: 2021-10-04

## 2021-06-30 NOTE — TELEPHONE ENCOUNTER
Pt was called for virtual appointment check in.       Electronically signed by Chase Levy MA on 6/30/2021 at 10:06 AM

## 2021-06-30 NOTE — PROGRESS NOTES
Cali Alexander is a 21 y.o. female evaluated via telephone on 6/30/2021. Consent:  She and/or health care decision maker is aware that that she may receive a bill for this telephone service, depending on her insurance coverage, and has provided verbal consent to proceed: Yes      Documentation:  I communicated with the patient and/or health care decision maker about See Below. Details of this discussion including any medical advice provided: See Below      I affirm this is a Patient Initiated Episode with a Patient who has not had a related appointment within my department in the past 7 days or scheduled within the next 24 hours. Patient identification was verified at the start of the visit: Yes    Total Time: minutes: 11-20 minutes    Note: not billable if this call serves to triage the patient into an appointment for the relevant concern      ODESSA Torres CNP    6/30/2021        Progress Note    Cali Alexander 2000      Chief Complaint   Patient presents with    Medication Check         Subjective:    Patient is a 21 y.o. female diagnosed with mood disorder and presents today for follow-up. Last seen in clinic on 6/1/2021  and prior records were reviewed. Last visit: good. Mood is improving. Denies side effects, denies rash. Outbursts have decreased. Appetite is ok. Relationships are doing ok. Anxiety is doing really well, still having anxiety at times through the day but atarax is helpful. Today we discussed increasing her Lamictal to 50 mg daily for mood stability and continuing with therapy. Pt is agreeable. Pt sounded upbeat and cheerful on the phone. Pt was on speaker phone with mother, mother stated that patient was improving on medication. Today: medications are doing wonderful. She says she is sleeping very well.  lamictal is doing pretty amazing as well.   She stated that she is not having as many angry outbursts and that her sleep is well controlled she is not whimpering any longer and she is not locking herself in the closet. However I spoke to her mother and her mother stated that she is still getting upset probably 1 or 2 times per day that her tantrums are not nearly as severe as they had been and that she is still whimpering while she sleeps a little bit but it is only whenever she has a stressful day. We discussed increasing her Lamictal to 75 mg/day to see if her mood stabilizes even further patient is encouraged to call back if the nighttime reporting continues to increase. Patient denies any side effects of medications at this time she denies SI HI AVH. She reports her anxiety is well controlled and is not taking the atarax often. Absent  suicidal ideation. Reports compliance with medications as good . Sleep: better. She is doing better. Not clinching her hands as much    Caffeine use: no change    PREVIOUS MED TRIALS  celexa 40 mg  abilify 10    Current Substance Use:   Alcohol: none  Illicit drug use: denies   Marijuana: denies   Tobacco: denies   Vape: denies     BP: There were no vitals taken for this visit.       Review of Systems - 14 point review:  Negative     Constitutional: (fevers, chills, night sweats, wt loss/gain, change in appetite, fatigue, somnolence)    HEENT: (ear pain or discharge, hearing loss, ear ringing, sinus pressure, nosebleed, nasal discharge, sore throat, oral sores, tooth pain, bleeding gums, hoarse voice, neck pain)      Cardiovascular: (HTN, chest pain, elevated cholesterol/lipids, palpitations, leg swelling, leg pain with walking)    Respiratory: (cough, wheezing, snoring, SOB with activity (dyspnea), SOB while lying flat (orthopnea), awakening with severe SOB (paroxysmal nocturnal dyspnea))    Gastrointestinal: (NVD, constipation, abdominal pain, bright red stools, black tarry stools, stool incontinence)     Genitourinary:  (pelvic pain, burning or frequency of urination, urinary urgency, blood in urine incomplete bladder emptying, urinary incontinence, STD; MEN: testicular pain or swelling, erectile dysfunction; WOMEN: LMP, heavy menstrual bleeding (menorrhagia), irregular periods, postmenopausal bleeding, menstrual pain (dymenorrhea, vaginal discharge)    Musculoskeletal: (bone pain/fracture, joint pain or swelling, musle pain)    Integumentary: (rashes, acne, non-healing sores, itching, breast lumps, breast pain, nipple discharge, hair loss)    Neurologic: (HA, muscle weakness, paresthesias (numbness, coldness, crawling or prickling), memory loss, seizure, dizziness)    Psychiatric:  (anxiety, sadness, irritability/anger, insomnia, suicidality)    Endocrine: (heat or cold intolerance, excessive thirst (polydipsia), excessive hunger (polyphagia))    Immune/Allergic: (hives, seasonal or environmental allergies, HIV exposure)    Hematologic/Lymphatic: (lymph node enlargement, easy bleeding or bruising)    History obtained via chart review and patient    PCP is Lisa Cordova MD       Current Meds:    Prior to Admission medications    Medication Sig Start Date End Date Taking? Authorizing Provider   lamoTRIgine (LAMICTAL) 150 MG tablet Take 0.5 tablets by mouth daily 6/30/21  Yes ODESSA Louie CNP   hydrOXYzine (ATARAX) 25 MG tablet Take 1 tablet by mouth 3 times daily as needed for Anxiety 6/1/21 7/1/21  ODESSA Louie CNP   omeprazole (PRILOSEC) 20 MG delayed release capsule TAKE ONE CAPSULE BY MOUTH EVERY DAY FOR REFLUX 4/15/21   Historical Provider, MD   prazosin (MINIPRESS) 1 MG capsule Take 1 capsule by mouth nightly 5/18/21   ODESSA Louie CNP   norethindrone-ethinyl estradiol (JUNEL FE 1/20) 1-20 MG-MCG per tablet Take 1 tablet by mouth daily    Historical Provider, MD   metoclopramide (REGLAN) 10 MG tablet Take 1 tablet by mouth every 6 hours as needed (headache, nausea or vomiting) May cause drowsiness or diarrhea.  2/1/21   Mary Sahni MD     Social History Clinician Name: [address] [facility] Outpatient office  Phone: [phone] Option 3        Clinician Pager or Emergency Contact #: [phone]        8. Clinician Name: Pietro Guillermo MD Phone: [phone]        Clinician Pager or Emergency Contact #: [phone]        8. Suicide Prevention Lifeline: 4-319-676-TALK (2030)        4. [address] [place] [address] Emergency Services -  for example, [address], [facility] suicide hotline, [facility] Hotline:      [address]: Office number: [phone]        Emergency Services Address: Danelle Calderon, [address] [address]        Emergency Services Phone: CRISIS LINE: [phone]        Making the environment safe: How can I make my environment (house/apartment/living space) safer? For example, can I remove guns, medications, and other items? Remove any guns/weapons from home. 2.   Remove or lock up extra medications in a secure location     Social Determinants of Health     Financial Resource Strain:     Difficulty of Paying Living Expenses:    Food Insecurity:     Worried About Running Out of Food in the Last Year:     [address] in the Last Year:    Transportation Needs:     Lack of Transportation (Medical):  Lack of Transportation (Non-Medical):    Physical Activity:     Days of Exercise per Week:     Minutes of Exercise per Session:    Stress:     Feeling of Stress :    Social Connections:     Frequency of Communication with Friends and Family:     Frequency of Social Gatherings with Friends and Family:     Attends Mu-ism Services:     Active Member of Clubs or Organizations:     Attends Club or Organization Meetings:     Marital Status:    Intimate Partner Violence:     Fear of Current or Ex-Partner:     Emotionally Abused:     Physically Abused:     Sexually Abused:        MSE:  Appearance:  UTO due to phone call  Behavior: Calm, cooperative, and socially appropriate.    Speech: Normal in tone, volume, and quality. No slurring, dysarthria or pressured speech noted. Mood: \"Great\"   Affect: UTO due to phonecall  Thought Process: Appears linear, logical and goal oriented. Causality appears intact. Thought Content: Denies active suicidal and homicidal ideations. No overt delusions or paranoia appreciated. Perceptions: Denies auditory or visual hallucinations at present time. Not responding to internal stimuli. Concentration: Intact. Orientation: to person, place, date, and situation. Language: Intact. Fund of information: Intact. Memory: Recent and remote appear intact. Impulsivity: Limited. Neurovegitative: Fair appetite and sleep. Insight: Fair. Judgment: Fair. Cognition: Can spell \"world\" backwards: Yes                    Can do serial 7's:  Yes    Lab Results   Component Value Date     03/11/2019    K 3.9 03/11/2019     03/11/2019    CO2 26 03/11/2019    BUN 13 03/11/2019    CREATININE 0.7 03/11/2019    GLUCOSE 96 03/11/2019    CALCIUM 9.4 03/11/2019    PROT 8.0 03/11/2019    LABALBU 4.7 03/11/2019    BILITOT 0.5 03/11/2019    ALKPHOS 70 03/11/2019    AST 20 03/11/2019    ALT 20 03/11/2019    LABGLOM >60 03/11/2019     Lab Results   Component Value Date     03/11/2019    K 3.9 03/11/2019     03/11/2019    CO2 26 03/11/2019    BUN 13 03/11/2019    CREATININE 0.7 03/11/2019    GLUCOSE 96 03/11/2019    CALCIUM 9.4 03/11/2019      Lab Results   Component Value Date    CHOL 193 03/14/2019     Lab Results   Component Value Date    TRIG 98 03/14/2019     Lab Results   Component Value Date    HDL 47 (L) 03/14/2019     Lab Results   Component Value Date    LDLCALC 126 03/14/2019     No results found for: LABVLDL, VLDL  No results found for: [facility]  Lab Results   Component Value Date    LABA1C 5.4 03/14/2019     No results found for: EAG  Lab Results   Component Value Date    TSH 1.400 03/13/2019     Lab Results   Component Value Date    VITD25 19.9 (L) 03/13/2019     Lab Results   Component Value Date    AYZVKXEP53 9354 (H) 03/13/2019      No results found for: FOLATE     Assessment:   1. Mood disorder (HCC)        No evidence of acute suicidality, homicidality or psychosis observed. Patient is psychiatrically stable    Plan:    1. Continue   Atarax 25 mg 3 times a day as needed for anxiety  Minipress 1 mg at bedtime for PTSD and nightmares. Lamictal to 75 mg for mood stabilization    Start      Discontinue      The risks, benefits, side effects, indications, contraindications, and adverse effects of the medications have been discussed. Yes.  2. The pt has verbalized understanding and has capacity to give informed consent. 3. The Mary Auguste report has been reviewed according to [facility] regulations. 4. Supportive therapy offered. 5. Follow up: Return in about 4 weeks (around 7/28/2021). 6. The patient has been advised to call with any problems. 7. Controlled substance Treatment Plan: NA.  8. The above listed medications have been continued, modifications in meds and other orders/labs as follows:      Orders Placed This Encounter   Medications    lamoTRIgine (LAMICTAL) 150 MG tablet     Sig: Take 0.5 tablets by mouth daily     Dispense:  15 tablet     Refill:  2      No orders of the defined types were placed in this encounter. 9. Additional comments: Continue therapy, discussed sleep hygiene, discussed the use of coping skills and relaxation strategies to manage symptoms. 10.Over 50% of the total visit time of   15 minutes was spent on counseling and/or coordination of care of:                        1. Mood disorder ([facility]                      Psychotherapy Topics: mood/medication effectiveness family and health    Shirley Hernandez, ODESSA - CNP    This dictation was generated by voice recognition computer software. Although all attempts are made to edit the dictation for accuracy, there may be errors in the transcription that are not intended.

## 2021-07-01 ENCOUNTER — TELEPHONE (OUTPATIENT)
Dept: PSYCHIATRY | Age: 21
End: 2021-07-01

## 2021-07-01 ENCOUNTER — VIRTUAL VISIT (OUTPATIENT)
Dept: PSYCHIATRY | Age: 21
End: 2021-07-01
Payer: OTHER GOVERNMENT

## 2021-07-01 DIAGNOSIS — F39 MOOD DISORDER (HCC): Primary | ICD-10-CM

## 2021-07-01 PROCEDURE — 98968 PH1 ASSMT&MGMT NQHP 21-30: CPT | Performed by: SOCIAL WORKER

## 2021-07-01 ASSESSMENT — PATIENT HEALTH QUESTIONNAIRE - PHQ9
1. LITTLE INTEREST OR PLEASURE IN DOING THINGS: 1
SUM OF ALL RESPONSES TO PHQ QUESTIONS 1-9: 8
8. MOVING OR SPEAKING SO SLOWLY THAT OTHER PEOPLE COULD HAVE NOTICED. OR THE OPPOSITE, BEING SO FIGETY OR RESTLESS THAT YOU HAVE BEEN MOVING AROUND A LOT MORE THAN USUAL: 1
2. FEELING DOWN, DEPRESSED OR HOPELESS: 1
9. THOUGHTS THAT YOU WOULD BE BETTER OFF DEAD, OR OF HURTING YOURSELF: 1
4. FEELING TIRED OR HAVING LITTLE ENERGY: 0
SUM OF ALL RESPONSES TO PHQ QUESTIONS 1-9: 8
7. TROUBLE CONCENTRATING ON THINGS, SUCH AS READING THE NEWSPAPER OR WATCHING TELEVISION: 2
5. POOR APPETITE OR OVEREATING: 1
6. FEELING BAD ABOUT YOURSELF - OR THAT YOU ARE A FAILURE OR HAVE LET YOURSELF OR YOUR FAMILY DOWN: 1
10. IF YOU CHECKED OFF ANY PROBLEMS, HOW DIFFICULT HAVE THESE PROBLEMS MADE IT FOR YOU TO DO YOUR WORK, TAKE CARE OF THINGS AT HOME, OR GET ALONG WITH OTHER PEOPLE: 1
SUM OF ALL RESPONSES TO PHQ9 QUESTIONS 1 & 2: 2
SUM OF ALL RESPONSES TO PHQ QUESTIONS 1-9: 7
3. TROUBLE FALLING OR STAYING ASLEEP: 0

## 2021-07-01 ASSESSMENT — ANXIETY QUESTIONNAIRES
4. TROUBLE RELAXING: 0-NOT AT ALL
GAD7 TOTAL SCORE: 10
5. BEING SO RESTLESS THAT IT IS HARD TO SIT STILL: 1-SEVERAL DAYS
6. BECOMING EASILY ANNOYED OR IRRITABLE: 3-NEARLY EVERY DAY
3. WORRYING TOO MUCH ABOUT DIFFERENT THINGS: 2-OVER HALF THE DAYS
2. NOT BEING ABLE TO STOP OR CONTROL WORRYING: 2-OVER HALF THE DAYS
7. FEELING AFRAID AS IF SOMETHING AWFUL MIGHT HAPPEN: 1-SEVERAL DAYS
1. FEELING NERVOUS, ANXIOUS, OR ON EDGE: 1-SEVERAL DAYS

## 2021-07-01 ASSESSMENT — COLUMBIA-SUICIDE SEVERITY RATING SCALE - C-SSRS
6. HAVE YOU EVER DONE ANYTHING, STARTED TO DO ANYTHING, OR PREPARED TO DO ANYTHING TO END YOUR LIFE?: NO
2. HAVE YOU ACTUALLY HAD ANY THOUGHTS OF KILLING YOURSELF?: NO
1. WITHIN THE PAST MONTH, HAVE YOU WISHED YOU WERE DEAD OR WISHED YOU COULD GO TO SLEEP AND NOT WAKE UP?: YES

## 2021-07-01 NOTE — PATIENT INSTRUCTIONS
example, for the fruits & vegetables category, say apple, banana, carrot, and so on. Body Awareness  The body awareness technique will bring you into the here-and-now by directing your focus to sensations in the body. Pay special attention to the physical sensations created by each step. 1. Take 5 long, deep breaths through your nose, and exhale through puckered lips. 2. Place both feet flat on the floor. Wiggle your toes. Curl and uncurl your toes several times. Spend a moment noticing the sensations in your feet. 3. Stomp your feet on the ground several times. Pay attention to the sensations in your feet and legs as you make contact with the ground. 4. Clench your hands into fists, then release the tension. Repeat this 10 times. 5. Press your palms together. Press them harder and hold this pose for 15 seconds. Pay attention to the feeling of tension in your hands and arms. 6. Rub your palms together briskly. Notice and sound and the feeling of warmth. 7. Reach your hands over your head like youre trying to reach the licha. Stretch like this for 5 seconds. Bring your arms down and let them relax at your sides. 8. Take 5 more deep breaths and notice the feeling of calm in your body. Mental Exercises  Use mental exercises to take your mind off uncomfortable thoughts and feelings. They are discreet and easy to use at nearly any time or place. [place] to see which work best for you.  Name all the objects you see.  Describe the steps in performing an activity you know how to do well. For example, how to shoot a basketball, prepare your favorite meal, or tie a knot.  Count backwards from 100 by 7.    an object and describe it in detail. Describe its color, texture, size, weight, scent, and any other qualities you notice.  Spell your full name, and the names of three other people, backwards.  Name all your family members, their ages, and one of their favorite activities.    Read something backwards, letter-by-letter. Practice for at least a few minutes.  Think of an object and draw it in your mind, or in the air with your finger. Try drawing your home, a vehicle, or an animal.        _____________________________________________    SAFETY PLAN     A suicide Safety Plan is a document that supports someone when they are having thoughts of suicide.     Warning Signs that indicate a suicidal crisis may be developing: What (situations, thoughts, feelings, body sensations, behaviors, etc.) do you experience that lets you know you are beginning to think about suicide? 1. Increased feelings anxiety  2. Running fingers/hands through hair  3. Will be more quiet     Internal Coping Strategies:  What things can I do (relaxation techniques, hobbies, physical activities, etc.) to take my mind off my problems without contacting another person? 1. Writing poetry  2. Reading Fanfic  3. Watching TV     People and social settings that provide distraction: Who can I call or where can I go to distract me? 1. Name: Bhavesh Coffey, boyienalejandra                   Phone: In phone  2. Name: Shook Paulina, close friend                Phone: In phone   3. Place: None currently            4. Place:      People whom I can ask for help: Who can I call when I need help - for example, friends, family, clergy, someone else? 1. Name: Bhavesh dukesienalejandra                        Phone: In phone  2. Name: Mother, Carolina Levi                         Phone: In phone  3. Name: Close friend, Bocuhra Gallardo                   Phone: In phone     Professionals or [address] [place] [address] I can contact during a crisis: Who can I call for help - for example, my doctor, my psychiatrist, my psychologist, a mental health provider, a suicide hotline? 1. Clinician Name: [address] [facility] Outpatient office  Phone: [phone] Option 3      Clinician Pager or Emergency Contact #: [phone]     2.  Clinician Name: Melody Espinoza MD Phone: [phone]      Clinician Pager or Emergency Contact #: [phone]     3. Suicide Prevention Lifeline: 3-128-481-TALK (0792)     4. Local Behavioral Health Emergency Services -  for example,  [facility] suicide hotline, [facility] Hotline:               [address] : Office number: [phone]      Emergency Services Address: Danelle Calderon[address] [facility] [id number]      Emergency Services Phone: CRISIS LINE: [phone]     Making the environment safe: How can I make my environment (house/apartment/living space) safer? For example, can I remove guns, medications, and other items? 1. Remove any guns/weapons from home.    2.   Remove or lock up extra medications in a secure location

## 2021-07-01 NOTE — PROGRESS NOTES
TELEHEALTH EVALUATION--Audio/Visual (During FUBYA-17 public health emergency)    Foster Cardoso is a 21 y.o. female evaluated via telephone on 7/1/2021. Consent:  She and/or health care decision maker is aware that that she may receive a bill for this telephone service, depending on her insurance coverage, and has provided verbal consent to proceed: Yes      Documentation:  See below      I affirm this is a Patient Initiated Episode with a Patient who has not had a related appointment within my department in the past 7 days or scheduled within the next 24 hours. Patient identification was verified at the start of the visit: Yes    Total Time: minutes: 21-30 minutes    The visit was conducted pursuant to the emergency declaration under the [address] [place] [address] authority and the [employer] Act. Patient identification was verified, and a caregiver was present when appropriate. The patient was located in a state where the provider was credentialed to provide care. Note: not billable if this call serves to triage the patient into an appointment for the relevant concern      Yeny Baptiste LCSW         Therapy Progress Note  Yeny MORAN, LIZZETHW  7/1/2021  9:15 AM CDT  9:50 AM    Patient location  : home  LCSW location : [address] [place] [address]      Time spent with Patient: 35 minutes  This is patient's second  Therapy appointment. Reason for Consult:  depression, anxiety and agitation  Referring Provider: No referring provider defined for this encounter. Foster Cardoso ,a 21 y.o. female, for follow up visit. Pt provided informed consent for the behavioral health program. Discussed with patient model of service to include the limits of confidentiality (i.e. abuse reporting, suicide intervention, etc.) and short-term intervention focused approach.   Discussed no show and late cancellation policy. Pt indicated understanding. S:    Today patient reports improvement in sleep, appetite, and mood. Patient voiced she notices she does continue to \"bottle up\" feelings of agitation and frustration. Explored thoughts, emotions, coping skills, and barriers. Pt processed about her herbie's health worsening and effects that is having on herself and mother. Therapist coached patient through utilizing problem-solving skills in stressful situations. Discussed coping skills/strategies such as; petting her animals to help calm self, talking with boyfriend, and deep breathing. Handout provided today; Grounding Techniques and Coping Skills List.    Also, completed the PHQ-9 and SALVADOR-7 today and compared scores to previous done during first session. Improvement was seen in both scores. Patient voiced happiness to see that her scores support how she has been feeling over the past few weeks. Pt reported she would like to follow up in approximately 6 weeks and verbalized she would call sooner if needed. Pt denies Suicidal Ideations, Homicidal Ideation, Auditory Hallucinations, Visual Hallucinations, Tactical Hallucinations. Assessments:  PHQ-9 Score: 8 ~ 04 Minimal. 59 Mild. 1014 Moderate. 1519 Moderately severe. 2027 Severe. 5/3/21: 19  SALVADOR-7 Score: 10 ~ 04: minimal anxiety. 59: mild anxiety. 1014: moderate anxiety. 1521: severe anxiety 5/3/21:18  C-SSRS Suicide Screening: Patient denies any current SI. Patient denies any suicide attempts in her lifetime. Patient denies any current/recent thoughts to harm self and denies any current/recent thoughts of wanting to harm others. Safety plan reviewed and patient received copy.     MSE:    Appearance    Unable to assess, appt by phone  Appetite Improved   Sleep disturbance \"actually better\"  Fatigue Yes, however reports improvement  Loss of pleasure Patient reports she notices she is gaining some back  Impulsive behavior No  Speech    normal rate and normal volume  Mood    \"good\"  Affect    UTO due to phone call,  Thought Content: Denies active suicidal and homicidal ideations. No overt delusions or paranoia appreciated. Thought Process  Appears linear, logical and goal oriented. Causality appears intact. Associations    logical connections  Insight    Fair  Judgment    Fair  Orientation    oriented to person, place, time, and general circumstances  Memory    recent and remote memory intact  Attention/Concentration    intact  Morbid ideation No  Suicide Assessment    no suicidal ideation      History:  Social History     Socioeconomic History    Marital status: Single     Spouse name: Not on file    Number of children: Not on file    Years of education: Not on file    Highest education level: Not on file   Occupational History    Not on file   Tobacco Use    Smoking status: Never Smoker    Smokeless tobacco: Never Used   Substance and Sexual Activity    Alcohol use: Never    Drug use: Never    Sexual activity: Never   Other Topics Concern    Not on file   Social History Narrative    SAFETY PLAN (completed 5/3/21)        A suicide Safety Plan is a document that supports someone when they are having thoughts of suicide. Warning Signs that indicate a suicidal crisis may be developing: What (situations, thoughts, feelings, body sensations, behaviors, etc.) do you experience that lets you know you are beginning to think about suicide? 1. Increased feelings anxiety    2. Running fingers/hands through hair    3. Will be more quiet        Internal Coping Strategies:  What things can I do (relaxation techniques, hobbies, physical activities, etc.) to take my mind off my problems without contacting another person? 1. Writing poetry    2. Reading Fanfic    3. Watching TV        People and social settings that provide distraction: Who can I call or where can I go to distract me? 1. Name: Marlys Chen, boyfriend  Phone: In phone    2.  Name: ted Zuleta friend  Phone: In phone     3. Place: None currently              4. Place:         People whom I can ask for help: Who can I call when I need help - for example, friends, family, clergy, someone else? 1. Name: Ford العراقي boyfriend                 Phone: In phone    2. Name: Mother, Ronald Lilly      Phone: In phone    3. Name: Close friend, Brian Mehta       Phone: In phone        Professionals or [facility] [address] I can contact during a crisis: Who can I call for help - for example, my doctor, my psychiatrist, my psychologist, a mental health provider, a suicide hotline? 1. Clinician Name: [facility] Outpatient office  Phone: [phone] Option 3        Clinician Pager or Emergency Contact #: [phone]        3. Clinician Name: Jarred Mcginnis MD Phone: [phone]        Clinician Pager or Emergency Contact #: [phone]        6. Suicide Prevention Lifeline: 7-472-955-TALK (0168)        4. [address] [place] [address] Emergency Services -  for example, [address], [facility] suicide hotline, [facility] Hotline:      [address]: Office number: [phone]        Emergency Services Address: [address], [place] [address]        Emergency Services Phone: CRISIS LINE: [phone]        Making the environment safe: How can I make my environment (house/apartment/living space) safer? For example, can I remove guns, medications, and other items? Remove any guns/weapons from home. 2.   Remove or lock up extra medications in a secure location     Social Determinants of Health     Financial Resource Strain:     Difficulty of Paying Living Expenses:    Food Insecurity:     Worried About Running Out of Food in the Last Year:     [address] in the Last Year:    Transportation Needs:     Lack of Transportation (Medical):      Lack of Transportation (Non-Medical):    Physical Activity:     Days of Exercise per Week:     Minutes of Exercise per Session:    Stress:  Feeling of Stress :    Social Connections:     Frequency of Communication with Friends and Family:     Frequency of Social Gatherings with Friends and Family:     Attends Jehovah's witness Services:     Active Member of Clubs or Organizations:     Attends Club or Organization Meetings:     Marital Status:    Intimate Partner Violence:     Fear of Current or Ex-Partner:     Emotionally Abused:     Physically Abused:     Sexually Abused:        Medications:   Current Outpatient Medications   Medication Sig Dispense Refill    lamoTRIgine (LAMICTAL) 150 MG tablet Take 0.5 tablets by mouth daily 15 tablet 2    hydrOXYzine (ATARAX) 25 MG tablet Take 1 tablet by mouth 3 times daily as needed for Anxiety 30 tablet 2    omeprazole (PRILOSEC) 20 MG delayed release capsule TAKE ONE CAPSULE BY MOUTH EVERY DAY FOR REFLUX      prazosin (MINIPRESS) 1 MG capsule Take 1 capsule by mouth nightly 30 capsule 3    norethindrone-ethinyl estradiol (JUNEL FE 1/20) 1-20 MG-MCG per tablet Take 1 tablet by mouth daily      metoclopramide (REGLAN) 10 MG tablet Take 1 tablet by mouth every 6 hours as needed (headache, nausea or vomiting) May cause drowsiness or diarrhea. 20 tablet 0     No current facility-administered medications for this visit. Social History:   Social History     Socioeconomic History    Marital status: Single     Spouse name: Not on file    Number of children: Not on file    Years of education: Not on file    Highest education level: Not on file   Occupational History    Not on file   Tobacco Use    Smoking status: Never Smoker    Smokeless tobacco: Never Used   Substance and Sexual Activity    Alcohol use: Never    Drug use: Never    Sexual activity: Never   Other Topics Concern    Not on file   Social History Narrative    SAFETY PLAN (completed 5/3/21)        A suicide Safety Plan is a document that supports someone when they are having thoughts of suicide.         Warning Signs that indicate a suicidal crisis may be developing: What (situations, thoughts, feelings, body sensations, behaviors, etc.) do you experience that lets you know you are beginning to think about suicide? 1. Increased feelings anxiety    2. Running fingers/hands through hair    3. Will be more quiet        Internal Coping Strategies:  What things can I do (relaxation techniques, hobbies, physical activities, etc.) to take my mind off my problems without contacting another person? 1. Writing poetry    2. Reading Fanfic    3. Watching TV        People and social settings that provide distraction: Who can I call or where can I go to distract me? 1. Name: Khalida Tamayokathyroseline johnsonienalejandra  Phone: In phone    2. Name: Meron Nassar, close friend  Phone: In phone     3. Place: None currently              4. Place:         People whom I can ask for help: Who can I call when I need help - for example, friends, family, clergy, someone else? 1. Name: Khalida escalona                 Phone: In phone    2. Name: Mother, Antoni Maldonado      Phone: In phone    3. Name: Close friend, Meron Nassar       Phone: In phone        Professionals or [address] [place], [place] [address]vd I can contact during a crisis: Who can I call for help - for example, my doctor, my psychiatrist, my psychologist, a mental health provider, a suicide hotline? 1. Clinician Name: [facility]  Phone: [phone] Option 3        Clinician Pager or Emergency Contact #: [phone]        5. Clinician Name: Jori Montenegro MD Phone: [phone]        Clinician Pager or Emergency Contact #: [phone]        7. Suicide Prevention Lifeline: 1-005-624-TALK (0461)        4.  [address] [place] [address] Emergency Services -  for example, [address], [facility] suicide hotline, [facility] Hotline:      [address]: Office number: [phone]        Emergency Services Address: [place] [address]        Emergency Services Phone: CRISIS LINE: [phone] Making the environment safe: How can I make my environment (house/apartment/living space) safer? For example, can I remove guns, medications, and other items? Remove any guns/weapons from home. 2.   Remove or lock up extra medications in a secure location     Social Determinants of Health     Financial Resource Strain:     Difficulty of Paying Living Expenses:    Food Insecurity:     Worried About Running Out of Food in the Last Year:     [address] in the Last Year:    Transportation Needs:     Lack of Transportation (Medical):  Lack of Transportation (Non-Medical):    Physical Activity:     Days of Exercise per Week:     Minutes of Exercise per Session:    Stress:     Feeling of Stress :    Social Connections:     Frequency of Communication with Friends and Family:     Frequency of Social Gatherings with Friends and Family:     Attends Jewish Services:     Active Member of Clubs or Organizations:     Attends Club or Organization Meetings:     Marital Status:    Intimate Partner Violence:     Fear of Current or Ex-Partner:     Emotionally Abused:     Physically Abused:     Sexually Abused:        TOBACCO:   reports that she has never smoked. She has never used smokeless tobacco.  ETOH:   reports no history of alcohol use. Family History:   No family history on file. Diagnosis:    1. Mood Disorder        Diagnosis Date    ADD (attention deficit disorder)     ADHD     Bipolar 1 disorder (HCC)      Problems related to the social environment    Plan:  1. Continue medication management  2. CBT to target cognitive distortions  3. Discuss therapeutic goals   A. Able to show and/or voice a decrease in behaviors stemming from agitation/anger. B. Develop and begin utilizing healthy coping skills/strategies.   4. Review (and begin to practice) handouts provided today; grounding techniques and coping skills list.        Pt interventions:  Provided handout on  grounding techniques and coping skills list, Trained in strategies for increasing balanced thinking, Provided education, Discussed self-care (sleep, nutrition, rewarding activities, social support, exercise), Discussed potential barriers to change, Established rapport, Supportive techniques and Emphasized self-care as important for managing overall health      Jackie Vivar LIZZETH MORANW    Pursuant to the emergency declaration under the [facility], [facility], [facility] waiver authority and the [employer] Act, this Virtual Visit was conducted, with patient's consent, to reduce the patient's risk of exposure to COVID-19 and provide continuity of care for an established patient. Services were provided through a video synchronous discussion virtually to substitute for in-person clinic visit.

## 2021-07-01 NOTE — TELEPHONE ENCOUNTER
Pt was called for virtual appointment check in.       Electronically signed by Kaden Adrian MA on 7/1/2021 at 8:19 AM

## 2021-07-28 ENCOUNTER — TELEPHONE (OUTPATIENT)
Dept: PSYCHIATRY | Age: 21
End: 2021-07-28

## 2021-07-28 ENCOUNTER — VIRTUAL VISIT (OUTPATIENT)
Dept: PSYCHIATRY | Age: 21
End: 2021-07-28
Payer: OTHER GOVERNMENT

## 2021-07-28 DIAGNOSIS — F39 MOOD DISORDER (HCC): Primary | ICD-10-CM

## 2021-07-28 PROCEDURE — 99443 PR PHYS/QHP TELEPHONE EVALUATION 21-30 MIN: CPT | Performed by: NURSE PRACTITIONER

## 2021-07-28 NOTE — PROGRESS NOTES
Janine Puckett is a 21 y.o. female evaluated via telephone on 7/28/2021. Consent:  She and/or health care decision maker is aware that that she may receive a bill for this telephone service, depending on her insurance coverage, and has provided verbal consent to proceed: Yes      Documentation:  I communicated with the patient and/or health care decision maker about See Below. Details of this discussion including any medical advice provided: See Below      I affirm this is a Patient Initiated Episode with a Patient who has not had a related appointment within my department in the past 7 days or scheduled within the next 24 hours. Patient identification was verified at the start of the visit: Yes    Total Time: minutes: 21-30 minutes    Note: not billable if this call serves to triage the patient into an appointment for the relevant concern      ODESSA King CNP    7/28/21          Progress Note    Janine Puckett 2000      Chief Complaint   Patient presents with    Medication Check         Subjective:    Patient is a 21 y.o. female diagnosed with mood disorder and presents today for follow-up. Last seen in clinic on 6/30/2021  and prior records were reviewed. Last visit: good. \"medications are doing wonderful. \"  She says she is sleeping very well.  lamictal is doing pretty amazing as well. She stated that she is not having as many angry outbursts and that her sleep is well controlled she is not whimpering any longer and she is not locking herself in the closet. However I spoke to her mother and her mother stated that she is still getting upset probably 1 or 2 times per day that her tantrums are not nearly as severe as they had been and that she is still whimpering while she sleeps a little bit but it is only whenever she has a stressful day.   We discussed increasing her Lamictal to 75 mg/day to see if her mood stabilizes even further patient is encouraged to call back if the nighttime reporting continues to increase. Patient denies any side effects of medications at this time she denies SI HI AVH. She reports her anxiety is well controlled and is not taking the atarax often. Today:  Doing good. Over all doing ok. Still some snapping but its milder than before. Still getting agitated daily but its only 1 time per day. Goes some days without taking her medication. We discussed taking her medication as scheduled. She states she sometimes \"forgets\" to take her medication but her mother reminds her every day. Denies anxiety and depression. Still having nightmares. She reports forgetting her minipress and when she does she has nightmares. She sounds pleasant on the phone however she was on speaker phone and she would say something like sometimes she forgets to take her pills or that her outbursts are not as bad, and her mother would interject stating that her outbursts are as bad as ever but they are less and that she does not forget to take her pills because she is reminded to do so. We discussed putting an alarm on her phone and taking her medications immediately when her alarm goes off. Pt verbalized understanding. Absent  suicidal ideation. Reports compliance with medications as good . Sleep: better. She is doing better. Not clinching her hands as much. Some nightmares when she forgets to take her pills. Caffeine use: no change    PREVIOUS MED TRIALS  celexa 40 mg  abilify 10    Current Substance Use:   Alcohol: none  Illicit drug use: denies   Marijuana: denies   Tobacco: denies   Vape: denies     BP: There were no vitals taken for this visit.       Review of Systems - 14 point review:  Negative     Constitutional: (fevers, chills, night sweats, wt loss/gain, change in appetite, fatigue, somnolence)    HEENT: (ear pain or discharge, hearing loss, ear ringing, sinus pressure, nosebleed, nasal discharge, sore throat, oral sores, tooth pain, bleeding gums, hoarse voice, neck pain)      Cardiovascular: (HTN, chest pain, elevated cholesterol/lipids, palpitations, leg swelling, leg pain with walking)    Respiratory: (cough, wheezing, snoring, SOB with activity (dyspnea), SOB while lying flat (orthopnea), awakening with severe SOB (paroxysmal nocturnal dyspnea))    Gastrointestinal: (NVD, constipation, abdominal pain, bright red stools, black tarry stools, stool incontinence)     Genitourinary:  (pelvic pain, burning or frequency of urination, urinary urgency, blood in urine incomplete bladder emptying, urinary incontinence, STD; MEN: testicular pain or swelling, erectile dysfunction; WOMEN: LMP, heavy menstrual bleeding (menorrhagia), irregular periods, postmenopausal bleeding, menstrual pain (dymenorrhea, vaginal discharge)    Musculoskeletal: (bone pain/fracture, joint pain or swelling, musle pain)    Integumentary: (rashes, acne, non-healing sores, itching, breast lumps, breast pain, nipple discharge, hair loss)    Neurologic: (HA, muscle weakness, paresthesias (numbness, coldness, crawling or prickling), memory loss, seizure, dizziness)    Psychiatric:  (anxiety, sadness, irritability/anger, insomnia, suicidality)    Endocrine: (heat or cold intolerance, excessive thirst (polydipsia), excessive hunger (polyphagia))    Immune/Allergic: (hives, seasonal or environmental allergies, HIV exposure)    Hematologic/Lymphatic: (lymph node enlargement, easy bleeding or bruising)    History obtained via chart review and patient    PCP is Luis Gomez MD       Current Meds:    Prior to Admission medications    Medication Sig Start Date End Date Taking?  Authorizing Provider   lamoTRIgine (LAMICTAL) 150 MG tablet Take 0.5 tablets by mouth daily 6/30/21   ODESSA Gonzalez - CNP   omeprazole (PRILOSEC) 20 MG delayed release capsule TAKE ONE CAPSULE BY MOUTH EVERY DAY FOR REFLUX 4/15/21   Historical Provider, MD   prazosin (MINIPRESS) 1 MG capsule Take 1 capsule by mouth nightly 5/18/21   Corey Rosas APRN - CNP   norethindrone-ethinyl estradiol (JUNEL FE 1/20) 1-20 MG-MCG per tablet Take 1 tablet by mouth daily    Historical Provider, MD   metoclopramide (REGLAN) 10 MG tablet Take 1 tablet by mouth every 6 hours as needed (headache, nausea or vomiting) May cause drowsiness or diarrhea. 2/1/21   Apryl Pascual MD     Social History     Socioeconomic History    Marital status: Single     Spouse name: Not on file    Number of children: Not on file    Years of education: Not on file    Highest education level: Not on file   Occupational History    Not on file   Tobacco Use    Smoking status: Never Smoker    Smokeless tobacco: Never Used   Substance and Sexual Activity    Alcohol use: Never    Drug use: Never    Sexual activity: Never   Other Topics Concern    Not on file   Social History Narrative    SAFETY PLAN (completed 5/3/21)        A suicide Safety Plan is a document that supports someone when they are having thoughts of suicide. Warning Signs that indicate a suicidal crisis may be developing: What (situations, thoughts, feelings, body sensations, behaviors, etc.) do you experience that lets you know you are beginning to think about suicide? 1. Increased feelings anxiety    2. Running fingers/hands through hair    3. Will be more quiet        Internal Coping Strategies:  What things can I do (relaxation techniques, hobbies, physical activities, etc.) to take my mind off my problems without contacting another person? 1. Writing poetry    2. Reading Fanfic    3. Watching TV        People and social settings that provide distraction: Who can I call or where can I go to distract me? 1. Name: Ford العراقي, boyfriend  Phone: In phone    2. Name: Brian Mehta, close friend  Phone: In phone     3.  Place: None currently              4. Place:         People whom I can ask for help: Who can I call when I need help - for example, friends, family, clergy, someone else?    1. Name: Ford العراقي boyfriend                 Phone: In phone    2. Name: Mother, Ronald Lilly      Phone: In phone    3. Name: Close friend, Brian Mehta       Phone: In phone        Professionals or [address] I can contact during a crisis: Who can I call for help - for example, my doctor, my psychiatrist, my psychologist, a mental health provider, a suicide hotline? 1. Clinician Name: [address] [facility] office  Phone: [phone] Option 3        Clinician Pager or Emergency Contact #: [phone]        9. Clinician Name: Jarred Mcginnis MD Phone: [phone]        Clinician Pager or Emergency Contact #: [phone]        1. Suicide Prevention Lifeline: 6-689-187-TALK (8442)        4. [address] [place] [address] Emergency Services -  for example, [address], [facility] suicide hotline, [facility] Hotline:      [address]: Office number: [phone]        Emergency Services Address[facility] , [address] [address]        Emergency Services Phone: CRISIS LINE: [phone]        Making the environment safe: How can I make my environment (house/apartment/living space) safer? For example, can I remove guns, medications, and other items? Remove any guns/weapons from home. 2.   Remove or lock up extra medications in a secure location     Social Determinants of Health     Financial Resource Strain:     Difficulty of Paying Living Expenses:    Food Insecurity:     Worried About Running Out of Food in the Last Year:     [address] in the Last Year:    Transportation Needs:     Lack of Transportation (Medical):      Lack of Transportation (Non-Medical):    Physical Activity:     Days of Exercise per Week:     Minutes of Exercise per Session:    Stress:     Feeling of Stress :    Social Connections:     Frequency of Communication with Friends and Family:     Frequency of Social Gatherings with Friends and Family:     Attends [employer] Services: HDL 47 (L) 03/14/2019     Lab Results   Component Value Date    LDLCALC 126 03/14/2019     No results found for: LABVLDL, VLDL  No results found for: [facility]  Lab Results   Component Value Date    LABA1C 5.4 03/14/2019     No results found for: EAG  Lab Results   Component Value Date    TSH 1.400 03/13/2019     Lab Results   Component Value Date    VITD25 19.9 (L) 03/13/2019     Lab Results   Component Value Date    RCJNBGHV96 1091 (H) 03/13/2019      No results found for: FOLATE     Assessment:   1. Mood disorder (HCC)        No evidence of acute suicidality, homicidality or psychosis observed. Patient is psychiatrically stable    Plan:    1. Continue   Atarax 25 mg 3 times a day as needed for anxiety  Minipress 1 mg at bedtime for PTSD and nightmares. Lamictal 75 mg for mood stabilization    Start      Discontinue      The risks, benefits, side effects, indications, contraindications, and adverse effects of the medications have been discussed. Yes.  2. The pt has verbalized understanding and has capacity to give informed consent. 3. The Margarito Frankel report has been reviewed according to [facility] regulations. 4. Supportive therapy offered. 5. Follow up: Return in about 6 weeks (around 9/8/2021). 6. The patient has been advised to call with any problems. 7. Controlled substance Treatment Plan: NA.  8. The above listed medications have been continued, modifications in meds and other orders/labs as follows: No orders of the defined types were placed in this encounter. No orders of the defined types were placed in this encounter. 9. Additional comments: Continue therapy, discussed sleep hygiene, discussed the use of coping skills and relaxation strategies to manage symptoms.          10.Over 50% of the total visit time of 22 minutes was spent on counseling and/or coordination of care of:                        1. Mood disorder [facility])                      Psychotherapy Topics: mood/medication [facility] , ODESSA - CNP    This dictation was generated by voice recognition computer software. Although all attempts are made to edit the dictation for accuracy, there may be errors in the transcription that are not intended.

## 2021-07-28 NOTE — TELEPHONE ENCOUNTER
Pt was called for virtual appointment check in.       Electronically signed by Janes Herrera MA on 7/28/2021 at 8:17 AM

## 2021-08-18 ENCOUNTER — TELEPHONE (OUTPATIENT)
Dept: PSYCHIATRY | Age: 21
End: 2021-08-18

## 2021-08-18 NOTE — TELEPHONE ENCOUNTER
Called pt for appointment reminder.   -left voicemail, requesting a return call      Electronically signed by Sawyer Garcia MA on 8/18/2021 at 3:35 PM

## 2021-09-08 ENCOUNTER — VIRTUAL VISIT (OUTPATIENT)
Dept: PSYCHIATRY | Age: 21
End: 2021-09-08
Payer: OTHER GOVERNMENT

## 2021-09-08 DIAGNOSIS — F39 MOOD DISORDER (HCC): Primary | ICD-10-CM

## 2021-09-08 PROCEDURE — 99443 PR PHYS/QHP TELEPHONE EVALUATION 21-30 MIN: CPT | Performed by: NURSE PRACTITIONER

## 2021-09-08 RX ORDER — TRAZODONE HYDROCHLORIDE 50 MG/1
50 TABLET ORAL NIGHTLY
Qty: 30 TABLET | Refills: 2 | Status: SHIPPED | OUTPATIENT
Start: 2021-09-08 | End: 2021-12-13

## 2021-09-08 RX ORDER — LAMOTRIGINE 100 MG/1
100 TABLET ORAL DAILY
Qty: 12 TABLET | Refills: 0 | Status: SHIPPED | OUTPATIENT
Start: 2021-09-08 | End: 2021-10-04 | Stop reason: ALTCHOICE

## 2021-09-08 NOTE — PATIENT INSTRUCTIONS
Lamictal titration  Lamictal 100 mg for 7 days, 125 for 7 days (1/2 100 mg tablet + 1/2 150 mg tablet to equal 125), then take 1x 150 mg tablet daily. Lamictal in rare occasions may cause a life threatening rash called Zendejas-Yury Syndrome. If you develop a rash, experience any swelling of the tongue, lips or eyes, then stop taking the medication.  If the condition persists after stopping the medication, then go to an Urgent Care or to an Emergency Department to be seen and let them know that you have been taking Lamictal.

## 2021-09-08 NOTE — PROGRESS NOTES
are as bad as ever but they are less and that she does not forget to take her pills because she is reminded to do so. We discussed putting an alarm on her phone and taking her medications immediately when her alarm goes off. Pt verbalized understanding. Today:  Doing good today. She had covid and is recovering. She feels like she got it from her boyfriend, she feels like he picked it up from work. She is going to get vaccinated after quarantine. Pt states she still has difficulty taking her medication. States she is still having anger outbursts just about once per day. Her mother was present on speaker phone today during interview, mother stated that her anxiety outbursts are getting more aggressive. We discussed a Lamictal titration schedule. Patient also had complaints of difficulty falling asleep due to increased stress and anxiety. Patient stated anxiety is perpetuated by finances. We discussed beginning trazodone 50 mg nightly for insomnia. Mother stated that she is still having difficulty remembering to take her medications daily even after being reminded. Patient states barrier to this is that she forgets if she takes her medication and does not want to take more than she is supposed to. She sounds upbeat and pleasant on the phone denies side effects from medications at this time denies SI HI AVH. Trazodone- Discussed benefits, alternatives and risks involved with care, including - but not limited to - possible adverse effects of dizziness, hypotension, increased risk of falls w/ need to slowly transition between positions, excessive drowsiness, dry mouth, constipation or allergic reaction were discussed with the patient. Further discussed possibility of Serotonin Syndrome (sx including diaphoresis, agitation, muscle tension increase/rigidity, fever) with use of other serotonergic agents. Advised caution in operating vehicles/machinery after taking trazodone.       Complaining of stress and anxiety, having difficulty getting to sleep. Absent  suicidal ideation. Reports compliance with medications as good . Sleep: better. She is doing better. Not clinching her hands as much. Some nightmares when she forgets to take her pills, difficulty falling asleep at times. Caffeine use: no change    PREVIOUS MED TRIALS  celexa 40 mg  abilify 10    Current Substance Use:   Alcohol: none  Illicit drug use: denies   Marijuana: denies   Tobacco: denies   Vape: denies     BP: There were no vitals taken for this visit.       Review of Systems - 14 point review:  Negative     Constitutional: (fevers, chills, night sweats, wt loss/gain, change in appetite, fatigue, somnolence)    HEENT: (ear pain or discharge, hearing loss, ear ringing, sinus pressure, nosebleed, nasal discharge, sore throat, oral sores, tooth pain, bleeding gums, hoarse voice, neck pain)      Cardiovascular: (HTN, chest pain, elevated cholesterol/lipids, palpitations, leg swelling, leg pain with walking)    Respiratory: (cough, wheezing, snoring, SOB with activity (dyspnea), SOB while lying flat (orthopnea), awakening with severe SOB (paroxysmal nocturnal dyspnea))    Gastrointestinal: (NVD, constipation, abdominal pain, bright red stools, black tarry stools, stool incontinence)     Genitourinary:  (pelvic pain, burning or frequency of urination, urinary urgency, blood in urine incomplete bladder emptying, urinary incontinence, STD; MEN: testicular pain or swelling, erectile dysfunction; WOMEN: LMP, heavy menstrual bleeding (menorrhagia), irregular periods, postmenopausal bleeding, menstrual pain (dymenorrhea, vaginal discharge)    Musculoskeletal: (bone pain/fracture, joint pain or swelling, musle pain)    Integumentary: (rashes, acne, non-healing sores, itching, breast lumps, breast pain, nipple discharge, hair loss)    Neurologic: (HA, muscle weakness, paresthesias (numbness, coldness, crawling or prickling), memory loss, seizure, dizziness)    Psychiatric:  (anxiety, sadness, irritability/anger, insomnia, suicidality)    Endocrine: (heat or cold intolerance, excessive thirst (polydipsia), excessive hunger (polyphagia))    Immune/Allergic: (hives, seasonal or environmental allergies, HIV exposure)    Hematologic/Lymphatic: (lymph node enlargement, easy bleeding or bruising)    History obtained via chart review and patient    PCP is Raymond Gonzalez MD       Current Meds:    Prior to Admission medications    Medication Sig Start Date End Date Taking? Authorizing Provider   lamoTRIgine (LAMICTAL) 100 MG tablet Take 1 tablet by mouth daily Lamictal 100 mg for 7 days, 125 for 7 days (1/2 100 mg tablet + 1/2 150 mg tablet to equal 125), then take 1x 150 mg tablet daily. 9/8/21  Yes ODESSA Graves CNP   traZODone (DESYREL) 50 MG tablet Take 1 tablet by mouth nightly 9/8/21  Yes ODESSA Graves CNP   lamoTRIgine (LAMICTAL) 150 MG tablet Take 0.5 tablets by mouth daily 6/30/21   ODESSA Graves CNP   omeprazole (PRILOSEC) 20 MG delayed release capsule TAKE ONE CAPSULE BY MOUTH EVERY DAY FOR REFLUX 4/15/21   Historical Provider, MD   prazosin (MINIPRESS) 1 MG capsule Take 1 capsule by mouth nightly 5/18/21   ODESSA Graves CNP   norethindrone-ethinyl estradiol (JUNEL FE 1/20) 1-20 MG-MCG per tablet Take 1 tablet by mouth daily    Historical Provider, MD   metoclopramide (REGLAN) 10 MG tablet Take 1 tablet by mouth every 6 hours as needed (headache, nausea or vomiting) May cause drowsiness or diarrhea.  2/1/21   Carole Winkler MD     Social History     Socioeconomic History    Marital status: Single     Spouse name: Not on file    Number of children: Not on file    Years of education: Not on file    Highest education level: Not on file   Occupational History    Not on file   Tobacco Use    Smoking status: Never Smoker    Smokeless tobacco: Never Used   Substance and Sexual Activity    Alcohol use: Never  Drug use: Never    Sexual activity: Never   Other Topics Concern    Not on file   Social History Narrative    SAFETY PLAN (completed 5/3/21)        A suicide Safety Plan is a document that supports someone when they are having thoughts of suicide. Warning Signs that indicate a suicidal crisis may be developing: What (situations, thoughts, feelings, body sensations, behaviors, etc.) do you experience that lets you know you are beginning to think about suicide? 1. Increased feelings anxiety    2. Running fingers/hands through hair    3. Will be more quiet        Internal Coping Strategies:  What things can I do (relaxation techniques, hobbies, physical activities, etc.) to take my mind off my problems without contacting another person? 1. Writing poetry    2. Reading Fanfic    3. Watching TV        People and social settings that provide distraction: Who can I call or where can I go to distract me? 1. Name: roseline Savageienalejandra  Phone: In phone    2. Name: Paul Loza, close friend  Phone: In phone     3. Place: None currently              4. Place:         People whom I can ask for help: Who can I call when I need help - for example, friends, family, clergy, someone else? 1. Name: Franklin mcfarlandfriend                 Phone: In phone    2. Name: Mother, Toney Wilkins      Phone: In phone    3. Name: Close friend, Paul Loza       Phone: In phone        Professionals or [address] [place] [address] I can contact during a crisis: Who can I call for help - for example, my doctor, my psychiatrist, my psychologist, a mental health provider, a suicide hotline? 1. Clinician Name: [id number] [facility] Outpatient office  Phone: [phone] Option 3        Clinician Pager or Emergency Contact #: [phone]        4. Clinician Name: Nabor Khalil MD Phone: [phone]        Clinician Pager or Emergency Contact #: [phone]        2. Suicide Prevention Lifeline: 2-784-906-NPLU (9249)        4.  [address] [place] [address] Emergency Services - for example, [address], [facility] suicide hotline, Francisca Martinez Hotline:      [address]: Office number: [phone]        Emergency Services Address: [address]elle [address]on, [address] [id number]        Emergency Services Phone: CRISIS LINE: [phone]        Making the environment safe: How can I make my environment (house/apartment/living space) safer? For example, can I remove guns, medications, and other items? Remove any guns/weapons from home. 2.   Remove or lock up extra medications in a secure location     Social Determinants of Health     Financial Resource Strain:     Difficulty of Paying Living Expenses:    Food Insecurity:     Worried About Running Out of Food in the Last Year:     [address] in the Last Year:    Transportation Needs:     Lack of Transportation (Medical):  Lack of Transportation (Non-Medical):    Physical Activity:     Days of Exercise per Week:     Minutes of Exercise per Session:    Stress:     Feeling of Stress :    Social Connections:     Frequency of Communication with Friends and Family:     Frequency of Social Gatherings with Friends and Family:     Attends [employer] Services:     Active Member of Clubs or Organizations:     Attends Club or Organization Meetings:     Marital Status:    Intimate Partner Violence:     Fear of Current or Ex-Partner:     Emotionally Abused:     Physically Abused:     Sexually Abused:        MSE:  Appearance:  UTO due to phone call  Behavior: Calm, cooperative, and socially appropriate. Speech: Normal in tone, volume, and quality. No slurring, dysarthria or pressured speech noted. Mood: \"pretty good\"   Affect: UTO due to phonecall  Thought Process: Appears linear, logical and goal oriented. Causality appears intact. Thought Content: Denies active suicidal and homicidal ideations. No overt delusions or paranoia appreciated.    Perceptions: Denies auditory or visual hallucinations at present time. Not responding to internal stimuli. Concentration: Intact. Orientation: to person, place, date, and situation. Language: Intact. Fund of information: Intact. Memory: Recent and remote appear intact. Impulsivity: Limited. Neurovegitative: Fair appetite and sleep. Insight: Fair. Judgment: Fair. Cognition: Can spell \"world\" backwards: Yes                    Can do serial 7's: Yes    Lab Results   Component Value Date     03/11/2019    K 3.9 03/11/2019     03/11/2019    CO2 26 03/11/2019    BUN 13 03/11/2019    CREATININE 0.7 03/11/2019    GLUCOSE 96 03/11/2019    CALCIUM 9.4 03/11/2019    PROT 8.0 03/11/2019    LABALBU 4.7 03/11/2019    BILITOT 0.5 03/11/2019    ALKPHOS 70 03/11/2019    AST 20 03/11/2019    ALT 20 03/11/2019    LABGLOM >60 03/11/2019     Lab Results   Component Value Date     03/11/2019    K 3.9 03/11/2019     03/11/2019    CO2 26 03/11/2019    BUN 13 03/11/2019    CREATININE 0.7 03/11/2019    GLUCOSE 96 03/11/2019    CALCIUM 9.4 03/11/2019      Lab Results   Component Value Date    CHOL 193 03/14/2019     Lab Results   Component Value Date    TRIG 98 03/14/2019     Lab Results   Component Value Date    HDL 47 (L) 03/14/2019     Lab Results   Component Value Date    LDLCALC 126 03/14/2019     No results found for: LABVLDL, VLDL  No results found for: [facility]  Lab Results   Component Value Date    LABA1C 5.4 03/14/2019     No results found for: EAG  Lab Results   Component Value Date    TSH 1.400 03/13/2019     Lab Results   Component Value Date    VITD25 19.9 (L) 03/13/2019     Lab Results   Component Value Date    MEAXRPJL52 1091 (H) 03/13/2019      No results found for: FOLATE     Assessment:   1. Mood disorder (HCC)        No evidence of acute suicidality, homicidality or psychosis observed. Patient is psychiatrically stable    Plan:    1.    Continue   Atarax 25 mg 3 times a day as needed for anxiety  Minipress 1 mg at bedtime for PTSD and nightmares. Lamictal titration   Lamictal 100 mg for 7 days, 125 for 7 days (1/2 100 mg tablet + 1/2 150 mg tablet to equal 125), then take 1x 150 mg tablet daily. Start    trazodone 50 mg nightly for insomnia    Discontinue      The risks, benefits, side effects, indications, contraindications, and adverse effects of the medications have been discussed. Yes.  2. The pt has verbalized understanding and has capacity to give informed consent. 3. The Donna Murrieta report has been reviewed according to [facility] regulations. 4. Supportive therapy offered. 5. Follow up: Return in about 4 weeks (around 10/6/2021). 6. The patient has been advised to call with any problems. 7. Controlled substance Treatment Plan: NA.  8. The above listed medications have been continued, modifications in meds and other orders/labs as follows:      Orders Placed This Encounter   Medications    lamoTRIgine (LAMICTAL) 100 MG tablet     Sig: Take 1 tablet by mouth daily Lamictal 100 mg for 7 days, 125 for 7 days (1/2 100 mg tablet + 1/2 150 mg tablet to equal 125), then take 1x 150 mg tablet daily. Dispense:  12 tablet     Refill:  0    traZODone (DESYREL) 50 MG tablet     Sig: Take 1 tablet by mouth nightly     Dispense:  30 tablet     Refill:  2      No orders of the defined types were placed in this encounter. 9. Additional comments: Continue therapy, discussed sleep hygiene, discussed the use of coping skills and relaxation strategies to manage symptoms. 10.Over 50% of the total visit time of 22 minutes was spent on counseling and/or coordination of care of:                        1. Mood disorder ([facility] [address]                      Psychotherapy Topics: mood/medication effectiveness family and health    Osmin Roberts, APRN - CNP    This dictation was generated by voice recognition computer software.   Although all attempts are made to edit the dictation for accuracy, there may be errors in the transcription that are not intended.

## 2021-09-17 ENCOUNTER — TELEPHONE (OUTPATIENT)
Dept: PSYCHIATRY | Age: 21
End: 2021-09-17

## 2021-09-17 NOTE — TELEPHONE ENCOUNTER
Called pt for appointment reminder.     -Pt confirmed      Electronically signed by Daryl Pham MA on 9/17/2021 at 9:11 AM

## 2021-10-05 ENCOUNTER — TELEPHONE (OUTPATIENT)
Dept: PSYCHIATRY | Age: 21
End: 2021-10-05

## 2021-10-05 NOTE — TELEPHONE ENCOUNTER
Pt made aware that refill RXs for Hydroxyzine, Lamotrigine and Prazosin had been sent to her pharmacy per Soo SAXENA.

## 2021-10-06 ENCOUNTER — VIRTUAL VISIT (OUTPATIENT)
Dept: PSYCHIATRY | Age: 21
End: 2021-10-06
Payer: OTHER GOVERNMENT

## 2021-10-06 ENCOUNTER — TELEPHONE (OUTPATIENT)
Dept: PSYCHIATRY | Age: 21
End: 2021-10-06

## 2021-10-06 DIAGNOSIS — F39 MOOD DISORDER (HCC): Primary | ICD-10-CM

## 2021-10-06 PROCEDURE — 99442 PR PHYS/QHP TELEPHONE EVALUATION 11-20 MIN: CPT | Performed by: NURSE PRACTITIONER

## 2021-10-06 NOTE — PROGRESS NOTES
Hussain Durant is a 24 y.o. female evaluated via telephone on 10/6/2021. Consent:  She and/or health care decision maker is aware that that she may receive a bill for this telephone service, depending on her insurance coverage, and has provided verbal consent to proceed: Yes      Documentation:  I communicated with the patient and/or health care decision maker about See Below. Details of this discussion including any medical advice provided: See Below      I affirm this is a Patient Initiated Episode with a Patient who has not had a related appointment within my department in the past 7 days or scheduled within the next 24 hours. Patient identification was verified at the start of the visit: Yes    Total Time: minutes: 21-30 minutes    Note: not billable if this call serves to triage the patient into an appointment for the relevant concern      ODESSA Vernon CNP      10/6/21            Progress Note    Hussain Durant 2000      Chief Complaint   Patient presents with    Medication Check    Follow-up         Subjective:    Patient is a 24 y.o. female diagnosed with mood disorder and presents today for follow-up. Last seen in clinic on 9/8/2021  and prior records were reviewed. Last visit: Doing good today. She had covid and is recovering. She feels like she got it from her boyfriend, she feels like he picked it up from work. She is going to get vaccinated after quarantine. Pt states she still has difficulty taking her medication. States she is still having anger outbursts just about once per day. Her mother was present on speaker phone today during interview, mother stated that her anxiety outbursts are getting more aggressive. We discussed a Lamictal titration schedule. Patient also had complaints of difficulty falling asleep due to increased stress and anxiety. Patient stated anxiety is perpetuated by finances.   We discussed beginning trazodone 50 mg nightly for insomnia. Mother stated that she is still having difficulty remembering to take her medications daily even after being reminded. Patient states barrier to this is that she forgets if she takes her medication and does not want to take more than she is supposed to. She sounds upbeat and pleasant on the phone denies side effects from medications at this time denies SI HI AVH. Today:  Doing good. Quite a bit less temper tantrums and outbursts. Sleeping a lot a better also. Taking her medication a lot more consistently she has started setting alarms on her phone to take her medications and doing them immediately. Sounds tired on the phone. States that she just woke up from a nap. She reports that family and boyfriend have noticed that she is doing much better. She has a little bit of stress because that her therapist is leaving so she is trying to find a new therapist that is in network. She denies SI HI AVH at this time she is calm and cooperative on the phone she denies side effects of medications. We discussed the importance of medication adherence as well as sleep hygiene and patient verbalized understanding. She reports that she is recovering well from [place] she sounds to have a bit of a stuffy nose on the phone but she states that it is from allergies. Absent  suicidal ideation. Reports compliance with medications as good . Sleep: better. She is doing better. Not clinching her hands as much. Some nightmares when she forgets to take her pills, difficulty falling asleep at times. Caffeine use: no change    PREVIOUS MED TRIALS  celexa 40 mg  abilify 10    Current Substance Use:   Alcohol: none  Illicit drug use: denies   Marijuana: denies   Tobacco: denies   Vape: denies     BP: There were no vitals taken for this visit.       Review of Systems - 14 point review:  Negative     Constitutional: (fevers, chills, night sweats, wt loss/gain, change in appetite, fatigue, somnolence)    HEENT: (ear pain or discharge, hearing loss, ear ringing, sinus pressure, nosebleed, nasal discharge, sore throat, oral sores, tooth pain, bleeding gums, hoarse voice, neck pain)      Cardiovascular: (HTN, chest pain, elevated cholesterol/lipids, palpitations, leg swelling, leg pain with walking)    Respiratory: (cough, wheezing, snoring, SOB with activity (dyspnea), SOB while lying flat (orthopnea), awakening with severe SOB (paroxysmal nocturnal dyspnea))    Gastrointestinal: (NVD, constipation, abdominal pain, bright red stools, black tarry stools, stool incontinence)     Genitourinary:  (pelvic pain, burning or frequency of urination, urinary urgency, blood in urine incomplete bladder emptying, urinary incontinence, STD; MEN: testicular pain or swelling, erectile dysfunction; WOMEN: LMP, heavy menstrual bleeding (menorrhagia), irregular periods, postmenopausal bleeding, menstrual pain (dymenorrhea, vaginal discharge)    Musculoskeletal: (bone pain/fracture, joint pain or swelling, musle pain)    Integumentary: (rashes, acne, non-healing sores, itching, breast lumps, breast pain, nipple discharge, hair loss)    Neurologic: (HA, muscle weakness, paresthesias (numbness, coldness, crawling or prickling), memory loss, seizure, dizziness)    Psychiatric:  (anxiety, sadness, irritability/anger, insomnia, suicidality)    Endocrine: (heat or cold intolerance, excessive thirst (polydipsia), excessive hunger (polyphagia))    Immune/Allergic: (hives, seasonal or environmental allergies, HIV exposure)    Hematologic/Lymphatic: (lymph node enlargement, easy bleeding or bruising)    History obtained via chart review and patient    PCP is Pasha Gama MD       Current Meds:    Prior to Admission medications    Medication Sig Start Date End Date Taking?  Authorizing Provider   hydrOXYzine (ATARAX) 25 MG tablet TAKE 1 TABLET BY MOUTH 3 TIMES DAILY AS NEEDED FOR ANXIETY 10/4/21 11/3/21  ODESSA Ramesh - CNP lamoTRIgine (LAMICTAL) 150 MG tablet TAKE 0.5 TABLETS BY MOUTH DAILY 10/4/21   ODESSA Dominguez CNP   prazosin (MINIPRESS) 1 MG capsule TAKE 1 CAPSULE BY MOUTH NIGHTLY 10/4/21   ODESSA Dominguez CNP   traZODone (DESYREL) 50 MG tablet Take 1 tablet by mouth nightly 9/8/21   ODESSA Dominguez CNP   omeprazole (PRILOSEC) 20 MG delayed release capsule TAKE ONE CAPSULE BY MOUTH EVERY DAY FOR REFLUX 4/15/21   Historical Provider, MD   norethindrone-ethinyl estradiol (Debria Lei FE 1/20) 1-20 MG-MCG per tablet Take 1 tablet by mouth daily    Historical Provider, MD   metoclopramide (REGLAN) 10 MG tablet Take 1 tablet by mouth every 6 hours as needed (headache, nausea or vomiting) May cause drowsiness or diarrhea. 2/1/21   Carlita Eldridge MD     Social History     Socioeconomic History    Marital status: Single     Spouse name: Not on file    Number of children: Not on file    Years of education: Not on file    Highest education level: Not on file   Occupational History    Not on file   Tobacco Use    Smoking status: Never Smoker    Smokeless tobacco: Never Used   Substance and Sexual Activity    Alcohol use: Never    Drug use: Never    Sexual activity: Never   Other Topics Concern    Not on file   Social History Narrative    SAFETY PLAN (completed 5/3/21)        A suicide Safety Plan is a document that supports someone when they are having thoughts of suicide. Warning Signs that indicate a suicidal crisis may be developing: What (situations, thoughts, feelings, body sensations, behaviors, etc.) do you experience that lets you know you are beginning to think about suicide? 1. Increased feelings anxiety    2. Running fingers/hands through hair    3. Will be more quiet        Internal Coping Strategies:  What things can I do (relaxation techniques, hobbies, physical activities, etc.) to take my mind off my problems without contacting another person? 1. Writing poetry    2. Reading Fanfic    3. Watching TV        People and social settings that provide distraction: Who can I call or where can I go to distract me? 1. Name: Peter Galdamez boyfrienalejandra  Phone: In phone    2. Name: Jennifer Moncada, close friend  Phone: In phone     3. Place: None currently              4. Place:         People whom I can ask for help: Who can I call when I need help - for example, friends, family, clergy, someone else? 1. Name: Peter Galdamez boyfrienalejandra                 Phone: In phone    2. Name: Mother, Ilene Corona      Phone: In phone    3. Name: Close friend, Jennifer Moncada       Phone: In phone        Professionals or [address] I can contact during a crisis: Who can I call for help - for example, my doctor, my psychiatrist, my psychologist, a mental health provider, a suicide hotline? 1. Clinician Name: [address] [facility] Out[facility] office  Phone: [phone] Option 3        Clinician Pager or Emergency Contact #: [phone]        8. Clinician Name: Mario Ceja MD Phone: [phone]        Clinician Pager or Emergency Contact #: [phone]        1. Suicide Prevention Lifeline: 4-527-934-TALK (9850)        4. [address] [place] [address] Emergency Services -  for example, [address], [facility] Hotline:      [address]: Office number: [phone]        [facility]: [facility] [place] [address], [address] [id number]        Emergency Services Phone: CRISIS LINE: [phone]        Making the environment safe: How can I make my environment (house/apartment/living space) safer? For example, can I remove guns, medications, and other items? Remove any guns/weapons from home.      2.   Remove or lock up extra medications in a secure location     Social Determinants of Health     Financial Resource Strain:     Difficulty of Paying Living Expenses:    Food Insecurity:     Worried About Running Out of Food in the Last Year:     [address] in the Last Year: Transportation Needs:     Lack of Transportation (Medical):  Lack of Transportation (Non-Medical):    Physical Activity:     Days of Exercise per Week:     Minutes of Exercise per Session:    Stress:     Feeling of Stress :    Social Connections:     Frequency of Communication with Friends and Family:     Frequency of Social Gatherings with Friends and Family:     Attends Muslim Services:     Active Member of Clubs or Organizations:     Attends Club or Organization Meetings:     Marital Status:    Intimate Partner Violence:     Fear of Current or Ex-Partner:     Emotionally Abused:     Physically Abused:     Sexually Abused:        MSE:  Appearance:  UTO due to phone call  Behavior: Calm, cooperative, and socially appropriate. Speech: Normal in tone, volume, and quality. No slurring, dysarthria or pressured speech noted. Mood: \"so much better\"   Affect: UTO due to phonecall  Thought Process: Appears linear, logical and goal oriented. Causality appears intact. Thought Content: Denies active suicidal and homicidal ideations. No overt delusions or paranoia appreciated. Perceptions: Denies auditory or visual hallucinations at present time. Not responding to internal stimuli. Concentration: Intact. Orientation: to person, place, date, and situation. Language: Intact. Fund of information: Intact. Memory: Recent and remote appear intact. Impulsivity: Limited. Neurovegitative: Fair appetite and sleep. Insight: Fair. Judgment: Fair. Cognition: Can spell \"world\" backwards: Yes                    Can do serial 7's:  Yes    Lab Results   Component Value Date     03/11/2019    K 3.9 03/11/2019     03/11/2019    CO2 26 03/11/2019    BUN 13 03/11/2019    CREATININE 0.7 03/11/2019    GLUCOSE 96 03/11/2019    CALCIUM 9.4 03/11/2019    PROT 8.0 03/11/2019    LABALBU 4.7 03/11/2019    BILITOT 0.5 03/11/2019    ALKPHOS 70 03/11/2019    AST 20 03/11/2019    ALT 20 03/11/2019 LABGLOM >60 03/11/2019     Lab Results   Component Value Date     03/11/2019    K 3.9 03/11/2019     03/11/2019    CO2 26 03/11/2019    BUN 13 03/11/2019    CREATININE 0.7 03/11/2019    GLUCOSE 96 03/11/2019    CALCIUM 9.4 03/11/2019      Lab Results   Component Value Date    CHOL 193 03/14/2019     Lab Results   Component Value Date    TRIG 98 03/14/2019     Lab Results   Component Value Date    HDL 47 (L) 03/14/2019     Lab Results   Component Value Date    LDLCALC 126 03/14/2019     No results found for: LABVLDL, VLDL  No results found for: [facility]  Lab Results   Component Value Date    LABA1C 5.4 03/14/2019     No results found for: EAG  Lab Results   Component Value Date    TSH 1.400 03/13/2019     Lab Results   Component Value Date    VITD25 19.9 (L) 03/13/2019     Lab Results   Component Value Date    ATTPQZVJ27 1091 (H) 03/13/2019      No results found for: FOLATE     Assessment:   1. Mood disorder (HCC)        No evidence of acute suicidality, homicidality or psychosis observed. Patient is psychiatrically stable    Plan:    1. Continue   Atarax 25 mg 3 times a day as needed for anxiety  Minipress 1 mg at bedtime for PTSD and nightmares. Lamictal 150 mg daily for mood stabilization  trazodone 50 mg nightly for insomnia    Discontinue      The risks, benefits, side effects, indications, contraindications, and adverse effects of the medications have been discussed. Yes.  2. The pt has verbalized understanding and has capacity to give informed consent. 3. The Gaither Moritz report has been reviewed according to [facility] regulations. 4. Supportive therapy offered. 5. Follow up: Return in about 6 weeks (around 11/17/2021). 6. The patient has been advised to call with any problems. 7. Controlled substance Treatment Plan: NA.  8. The above listed medications have been continued, modifications in meds and other orders/labs as follows:       No orders of the defined types were placed in this encounter. No orders of the defined types were placed in this encounter. 9. Additional comments: Continue therapy, discussed sleep hygiene, discussed the use of coping skills and relaxation strategies to manage symptoms. Trazodone- Discussed benefits, alternatives and risks involved with care, including - but not limited to - possible adverse effects of dizziness, hypotension, increased risk of falls w/ need to slowly transition between positions, excessive drowsiness, dry mouth, constipation or allergic reaction were discussed with the patient. Further discussed possibility of Serotonin Syndrome (sx including diaphoresis, agitation, muscle tension increase/rigidity, fever) with use of other serotonergic agents. Advised caution in operating vehicles/machinery after taking trazodone. 10.Over 50% of the total visit time of 12 minutes was spent on counseling and/or coordination of care of:                        1. Mood disorder ([facility]                      Psychotherapy Topics: mood/medication effectiveness family and health    Jackie Calles, APRN - CNP    This dictation was generated by voice recognition computer software. Although all attempts are made to edit the dictation for accuracy, there may be errors in the transcription that are not intended.

## 2021-10-06 NOTE — TELEPHONE ENCOUNTER
Pt was called for virtual appointment check in.       Electronically signed by Claudell Echevaria, MA on 10/6/2021 at 9:33 AM

## 2021-11-15 ENCOUNTER — TELEPHONE (OUTPATIENT)
Dept: PSYCHIATRY | Age: 21
End: 2021-11-15

## 2021-11-15 RX ORDER — HYDROXYZINE HYDROCHLORIDE 25 MG/1
25 TABLET, FILM COATED ORAL 3 TIMES DAILY PRN
Qty: 30 TABLET | Refills: 2 | Status: SHIPPED | OUTPATIENT
Start: 2021-11-15 | End: 2022-02-15

## 2021-11-15 NOTE — TELEPHONE ENCOUNTER
Margarita sent med refill request below. Last office visit : 10/6/2021  with Jose SAXENA  Next office visit : 11/22/2021 with Jose SAXENA    Requested Prescriptions     Pending Prescriptions Disp Refills    hydrOXYzine (ATARAX) 25 MG tablet [Pharmacy Med Name: HYDROXYZINE HCL 25 MG TAB 25 Tablet] 30 tablet 2     Sig: TAKE 1 TABLET BY MOUTH 3 TIMES DAILY AS NEEDED FOR ANXIETY            Hillary Oliver RN     10/6/21                                               Progress Note     V[address] [address]                                 Chief Complaint   Patient presents with    Medication Check    Follow-up            Subjective:    Patient is a 24 y.o. female diagnosed with mood disorder and presents today for follow-up. Last seen in clinic on 9/8/2021  and prior records were reviewed.     Last visit: Doing good today. She had covid and is recovering. She feels like she got it from her boyfriend, she feels like he picked it up from work. She is going to get vaccinated after quarantine. Pt states she still has difficulty taking her medication. States she is still having anger outbursts just about once per day. Her mother was present on speaker phone today during interview, mother stated that her anxiety outbursts are getting more aggressive. We discussed a Lamictal titration schedule. Patient also had complaints of difficulty falling asleep due to increased stress and anxiety. Patient stated anxiety is perpetuated by finances. We discussed beginning trazodone 50 mg nightly for insomnia. Mother stated that she is still having difficulty remembering to take her medications daily even after being reminded. Patient states barrier to this is that she forgets if she takes her medication and does not want to take more than she is supposed to. She sounds upbeat and pleasant on the phone denies side effects from medications at this time denies SI HI AVH.     Today:  Doing good.   Quite a bit less temper tantrums and outbursts. Sleeping a lot a better also. Taking her medication a lot more consistently she has started setting alarms on her phone to take her medications and doing them immediately. Sounds tired on the phone. States that she just woke up from a nap. She reports that family and boyfriend have noticed that she is doing much better. She has a little bit of stress because that her therapist is leaving so she is trying to find a new therapist that is in network. She denies SI HI AVH at this time she is calm and cooperative on the phone she denies side effects of medications. We discussed the importance of medication adherence as well as sleep hygiene and patient verbalized understanding. She reports that she is recovering well from [place] she sounds to have a bit of a stuffy nose on the phone but she states that it is from allergies.           Absent  suicidal ideation. Reports compliance with medications as good .      Sleep: better. She is doing better. Not clinching her hands as much.   Some nightmares when she forgets to take her pills, difficulty falling asleep at times.     Caffeine use: no change     PREVIOUS MED TRIALS  celexa 40 mg  abilify 10     Current Substance Use:   Alcohol: none  Illicit drug use: denies   Marijuana: denies   Tobacco: denies   Vape: denies      BP: There were no vitals taken for this visit.        Review of Systems - 14 point review:  Negative      Constitutional: (fevers, chills, night sweats, wt loss/gain, change in appetite, fatigue, somnolence)     HEENT: (ear pain or discharge, hearing loss, ear ringing, sinus pressure, nosebleed, nasal discharge, sore throat, oral sores, tooth pain, bleeding gums, hoarse voice, neck pain)      Cardiovascular: (HTN, chest pain, elevated cholesterol/lipids, palpitations, leg swelling, leg pain with walking)     Respiratory: (cough, wheezing, snoring, SOB with activity (dyspnea), SOB while lying flat (orthopnea), awakening with severe SOB (paroxysmal nocturnal dyspnea))     Gastrointestinal: (NVD, constipation, abdominal pain, bright red stools, black tarry stools, stool incontinence)     Genitourinary:  (pelvic pain, burning or frequency of urination, urinary urgency, blood in urine incomplete bladder emptying, urinary incontinence, STD; MEN: testicular pain or swelling, erectile dysfunction; WOMEN: LMP, heavy menstrual bleeding (menorrhagia), irregular periods, postmenopausal bleeding, menstrual pain (dymenorrhea, vaginal discharge)     Musculoskeletal: (bone pain/fracture, joint pain or swelling, musle pain)     Integumentary: (rashes, acne, non-healing sores, itching, breast lumps, breast pain, nipple discharge, hair loss)     Neurologic: (HA, muscle weakness, paresthesias (numbness, coldness, crawling or prickling), memory loss, seizure, dizziness)     Psychiatric:  (anxiety, sadness, irritability/anger, insomnia, suicidality)     Endocrine: (heat or cold intolerance, excessive thirst (polydipsia), excessive hunger (polyphagia))     Immune/Allergic: (hives, seasonal or environmental allergies, HIV exposure)     Hematologic/Lymphatic: (lymph node enlargement, easy bleeding or bruising)     History obtained via chart review and patient     PCP is Stella Altamirano MD         Current Meds:     Home Medications   Prior to Admission medications    Medication Sig Start Date End Date Taking?  Authorizing Provider   hydrOXYzine (ATARAX) 25 MG tablet TAKE 1 TABLET BY MOUTH 3 TIMES DAILY AS NEEDED FOR ANXIETY 10/4/21 11/3/21   ODESSA Garcia CNP   lamoTRIgine (LAMICTAL) 150 MG tablet TAKE 0.5 TABLETS BY MOUTH DAILY 10/4/21     ODESSA Garcia CNP   prazosin (MINIPRESS) 1 MG capsule TAKE 1 CAPSULE BY MOUTH NIGHTLY 10/4/21     ODESSA Garcia CNP   traZODone (DESYREL) 50 MG tablet Take 1 tablet by mouth nightly 9/8/21     ODESSA Garcia CNP   omeprazole (PRILOSEC) 20 MG delayed release capsule TAKE ONE CAPSULE BY MOUTH EVERY DAY FOR REFLUX 4/15/21     Historical Provider, MD   norethindrone-ethinyl estradiol (JUNEL FE 1/20) 1-20 MG-MCG per tablet Take 1 tablet by mouth daily       Historical Provider, MD   metoclopramide (REGLAN) 10 MG tablet Take 1 tablet by mouth every 6 hours as needed (headache, nausea or vomiting) May cause drowsiness or diarrhea. 2/1/21     Marcella Miramontes MD         Social History   Social History            Socioeconomic History    Marital status: Single       Spouse name: Not on file    Number of children: Not on file    Years of education: Not on file    Highest education level: Not on file   Occupational History    Not on file   Tobacco Use    Smoking status: Never Smoker    Smokeless tobacco: Never Used   Substance and Sexual Activity    Alcohol use: Never    Drug use: Never    Sexual activity: Never   Other Topics Concern    Not on file   Social History Narrative     SAFETY PLAN (completed 5/3/21)           A suicide Safety Plan is a document that supports someone when they are having thoughts of suicide.           Warning Signs that indicate a suicidal crisis may be developing: What (situations, thoughts, feelings, body sensations, behaviors, etc.) do you experience that lets you know you are beginning to think about suicide?     1. Increased feelings anxiety     2. Running fingers/hands through hair     3. Will be more quiet           Internal Coping Strategies:  What things can I do (relaxation techniques, hobbies, physical activities, etc.) to take my mind off my problems without contacting another person?     1. Writing poetry     2. Reading Fanfic     3. Watching TV           People and social settings that provide distraction: Who can I call or where can I go to distract me?     1. Name: Katja Hawk, boyfriend                      Phone: In phone     2. Name: Lazaro Nava, close friend                   Phone: In phone      3.  Place: None currently   4. Place:            People whom I can ask for help: Who can I call when I need help - for example, friends, family, clergy, someone else?     1. Name: Dylan Robledo boyfriend                           Phone: In phone     2. Name: Mother, Pat Chavarria                            Phone: In phone     3. Name: Close friend, Justice Lee                      Phone: In phone           Professionals or [address] I can contact during a crisis: Who can I call for help - for example, my doctor, my psychiatrist, my psychologist, a mental health provider, a suicide hotline?     1. Clinician Name: [id number] [facility] office  Phone: [phone] Option 3         Clinician Pager or Emergency Contact #: [phone]           2. Clinician Name: Concepcion Richardson MD Phone: [phone]         Clinician Pager or Emergency Contact #: [phone]           3. Suicide Prevention Lifeline: 7-890-996-TALK (9735)           4. [address] [place] [address] Emergency Services -  for example, [address], [facility] suicide hotline, [facility] Hotline:                  [facility]: Office number: [phone]         Emergency Services Address: [address], [place] [address]         Emergency Services Phone: CRISIS LINE: [phone]           Making the environment safe: How can I make my environment (house/apartment/living space) safer? For example, can I remove guns, medications, and other items?     Remove any guns/weapons from home.      2. Remove or lock up extra medications in a secure location      Social Determinants of Health          Financial Resource Strain:     Difficulty of Paying Living Expenses:    Food Insecurity:     Worried About Running Out of Food in the Last Year:     [address] in the Last Year:    Transportation Needs:     Lack of Transportation (Medical):      Lack of Transportation (Non-Medical):    Physical Activity:     Days of Exercise per Week:     Minutes of Exercise per Session:    Stress:     Feeling of Stress :    Social Connections:     Frequency of Communication with Friends and Family:     Frequency of Social Gatherings with Friends and Family:     Attends Church Services:     Active Member of Clubs or Organizations:     Attends Club or Organization Meetings:     Marital Status:    Intimate Partner Violence:     Fear of Current or Ex-Partner:     Emotionally Abused:     Physically Abused:     Sexually Abused:             MSE:  Appearance:  UTO due to phone call  Behavior: Calm, cooperative, and socially appropriate. Speech: Normal in tone, volume, and quality. No slurring, dysarthria or pressured speech noted. Mood: \"so much better\"   Affect: UTO due to phonecall  Thought Process: Appears linear, logical and goal oriented. Causality appears intact. Thought Content: Denies active suicidal and homicidal ideations. No overt delusions or paranoia appreciated. Perceptions: Denies auditory or visual hallucinations at present time. Not responding to internal stimuli. Concentration: Intact. Orientation: to person, place, date, and situation. Language: Intact. Fund of information: Intact. Memory: Recent and remote appear intact. Impulsivity: Limited. Neurovegitative: Fair appetite and sleep. Insight: Fair. Judgment: Fair.     Cognition: Can spell \"world\" backwards: Yes                    Can do serial 7's:  Yes           Lab Results   Component Value Date      03/11/2019     K 3.9 03/11/2019      03/11/2019     CO2 26 03/11/2019     BUN 13 03/11/2019     CREATININE 0.7 03/11/2019     GLUCOSE 96 03/11/2019     CALCIUM 9.4 03/11/2019     PROT 8.0 03/11/2019     LABALBU 4.7 03/11/2019     BILITOT 0.5 03/11/2019     ALKPHOS 70 03/11/2019     AST 20 03/11/2019     ALT 20 03/11/2019     LABGLOM >60 03/11/2019            Lab Results   Component Value Date      03/11/2019     K 3.9 03/11/2019      03/11/2019     CO2 26 03/11/2019     BUN 13 03/11/2019     CREATININE 0.7 03/11/2019     GLUCOSE 96 03/11/2019     CALCIUM 9.4 03/11/2019            Lab Results   Component Value Date     CHOL 193 03/14/2019            Lab Results   Component Value Date     TRIG 98 03/14/2019            Lab Results   Component Value Date     HDL 47 (L) 03/14/2019            Lab Results   Component Value Date     LDLCALC 126 03/14/2019      No results found for: LABVLDL, VLDL  No results found for: [facility]        Lab Results   Component Value Date     LABA1C 5.4 03/14/2019      No results found for: EAG        Lab Results   Component Value Date     TSH 1.400 03/13/2019            Lab Results   Component Value Date     VITD25 19.9 (L) 03/13/2019            Lab Results   Component Value Date     BLZYYHVO81 1091 (H) 03/13/2019      No results found for: FOLATE      Assessment:    1. Mood disorder ([facility] [address]          No evidence of acute suicidality, homicidality or psychosis observed. Patient is psychiatrically stable     Plan:     1. Continue   Atarax 25 mg 3 times a day as needed for anxiety  Minipress 1 mg at bedtime for PTSD and nightmares. Lamictal 150 mg daily for mood stabilization  trazodone 50 mg nightly for insomnia     Discontinue        The risks, benefits, side effects, indications, contraindications, and adverse effects of the medications have been discussed. Yes.  2. The pt has verbalized understanding and has capacity to give informed consent. 3. The [facility] report has been reviewed according to [facility] regulations. 4. Supportive therapy offered. 5. Follow up:    Return in about 6 weeks (around 11/17/2021). 6. The patient has been advised to call with any problems.   7. Controlled substance Treatment Plan: NA.  8. The above listed medications have been continued, modifications in meds and other orders/labs as follows:                   Encounter Medications    No orders of the defined types were placed in this encounter.                  No orders of the defined types were placed in this encounter.        9. Additional comments: Continue therapy, discussed sleep hygiene, discussed the use of coping skills and relaxation strategies to manage symptoms.      Trazodone- Discussed benefits, alternatives and risks involved with care, including - but not limited to - possible adverse effects of dizziness, hypotension, increased risk of falls w/ need to slowly transition between positions, excessive drowsiness, dry mouth, constipation or allergic reaction were discussed with the patient. Further discussed possibility of Serotonin Syndrome (sx including diaphoresis, agitation, muscle tension increase/rigidity, fever) with use of other serotonergic agents. Advised caution in operating vehicles/machinery after taking trazodone.     10. Over 50% of the total visit time of 12 minutes was spent on counseling and/or coordination of care of:                         1. [facility] ([facility]                        Psychotherapy Topics: mood/medication effectiveness family and health     ODESSA Gordon - CNP

## 2021-11-19 ENCOUNTER — TELEPHONE (OUTPATIENT)
Dept: PSYCHIATRY | Age: 21
End: 2021-11-19

## 2021-11-19 NOTE — TELEPHONE ENCOUNTER
Called pt for appointment reminder.   -left voicemail, requesting a return call      Electronically signed by Sadiq Thakkar MA on 11/19/2021 at 1:43 PM

## 2021-11-22 ENCOUNTER — TELEPHONE (OUTPATIENT)
Dept: PSYCHIATRY | Age: 21
End: 2021-11-22

## 2021-11-22 NOTE — TELEPHONE ENCOUNTER
Called pt was a no show. Called to check on them and    -Pt asked to reschedule and was rescheduled 1/21 @ 2:30.       Electronically signed by Estefanía Meade MA on 11/22/2021 at 1:50 PM

## 2021-12-13 ENCOUNTER — TELEPHONE (OUTPATIENT)
Dept: PSYCHIATRY | Age: 21
End: 2021-12-13

## 2021-12-13 RX ORDER — TRAZODONE HYDROCHLORIDE 50 MG/1
50 TABLET ORAL NIGHTLY
Qty: 30 TABLET | Refills: 2 | Status: SHIPPED | OUTPATIENT
Start: 2021-12-13 | End: 2022-03-09 | Stop reason: ALTCHOICE

## 2022-01-10 ENCOUNTER — TELEPHONE (OUTPATIENT)
Dept: PSYCHIATRY | Age: 22
End: 2022-01-10

## 2022-01-10 NOTE — TELEPHONE ENCOUNTER
Pt's mother made aware that RX for Lamotrigine 150 mg take one daily had been sent to pt's pharmacy per Tre SAXENA.

## 2022-01-26 ENCOUNTER — VIRTUAL VISIT (OUTPATIENT)
Dept: PSYCHIATRY | Age: 22
End: 2022-01-26
Payer: OTHER GOVERNMENT

## 2022-01-26 DIAGNOSIS — F39 MOOD DISORDER (HCC): Primary | ICD-10-CM

## 2022-01-26 DIAGNOSIS — F63.9 IMPULSE CONTROL DISORDER: ICD-10-CM

## 2022-01-26 PROCEDURE — 99443 PR PHYS/QHP TELEPHONE EVALUATION 21-30 MIN: CPT | Performed by: NURSE PRACTITIONER

## 2022-01-26 RX ORDER — QUETIAPINE FUMARATE 25 MG/1
25 TABLET, FILM COATED ORAL NIGHTLY
Qty: 30 TABLET | Refills: 2 | Status: SHIPPED | OUTPATIENT
Start: 2022-01-26 | End: 2022-05-05 | Stop reason: SDUPTHER

## 2022-01-26 RX ORDER — PRAZOSIN HYDROCHLORIDE 2 MG/1
2 CAPSULE ORAL NIGHTLY
Qty: 30 CAPSULE | Refills: 2 | Status: SHIPPED | OUTPATIENT
Start: 2022-01-26 | End: 2022-03-09

## 2022-01-26 RX ORDER — LAMOTRIGINE 200 MG/1
200 TABLET ORAL DAILY
Qty: 30 TABLET | Refills: 2 | Status: SHIPPED | OUTPATIENT
Start: 2022-01-26 | End: 2022-05-05 | Stop reason: SDUPTHER

## 2022-01-26 NOTE — PROGRESS NOTES
Yonis Crawford is a 24 y.o. female evaluated via telephone on 1/26/2022. Consent:  She and/or health care decision maker is aware that that she may receive a bill for this telephone service, depending on her insurance coverage, and has provided verbal consent to proceed: Yes      Documentation:  I communicated with the patient and/or health care decision maker about see below. Details of this discussion including any medical advice provided: see below      I affirm this is a Patient Initiated Episode with a Patient who has not had a related appointment within my department in the past 7 days or scheduled within the next 24 hours. The patient  (guardian) is aware that this is a billable service, which includes applicable co-pays. This virtual visit was conducted with patient's (and or legal guardian's) consent. This visit was conducted pursuant to the emergency declaration under the [place] act and the [address] authority in the coronavirus preparedness and response supplemental appropriation's ACT. Patient identification was verified and a caregiver was present when appropriate. The patient was located in a state where the provider was licensed to provide care. Patient identification was verified at the start of the visit: Yes    Total Time: minutes: 21-30 minutes    Note: not billable if this call serves to triage the patient into an appointment for the relevant concern    ODESSA Tijerina CNP      1/26/22        Progress Note    Yonis Crawford 2000      Chief Complaint   Patient presents with    Medication Check    Follow-up         Subjective:    Patient is a 24 y.o. female diagnosed with mood disorder and presents today for follow-up. Last seen in clinic on 10/6/2021  and prior records were reviewed. Last visit: Doing good. Quite a bit less temper tantrums and outbursts. Sleeping a lot a better also.   Taking her medication a lot more consistently she has started setting alarms on her phone to take her medications and doing them immediately. Sounds tired on the phone. States that she just woke up from a nap. She reports that family and boyfriend have noticed that she is doing much better. She has a little bit of stress because that her therapist is leaving so she is trying to find a new therapist that is in network. She denies SI HI AVH at this time she is calm and cooperative on the phone she denies side effects of medications. We discussed the importance of medication adherence as well as sleep hygiene and patient verbalized understanding. She reports that she is recovering well from [place] she sounds to have a bit of a stuffy nose on the phone but she states that it is from allergies. Today:  Feeling a little bit frazzled. She describes \"frazzled\" as anxious. She states overall she has been ok. Her grandmother passed away recently. She has not been processing the death very well. She is not in therapy or counseling. She is having insurance issues, so she is trying to get that straightened out. She reports her and her boyfriend are doing well. She has difficulty following directions at home. She is having racing mind before bed as well as increased nightmares we discussed stopping trazodone at this time and beginning Seroquel 25 mg nightly for mood stabilization as well as insomnia and impulse control. Additionally we discussed increasing her Lamictal to 200 mg daily and increasing her prazosin to 2 mg nightly. Her mother is present on the phone with her today, her and her mother both repeated directions back to provider at this time. She denies SI HI AVH she denies side effects of medications at this time she is calm and cooperative on the phone.   We revisited the importance of beginning therapy patient was given options for [facility] or [facility], patient's mother believes that they will be contacting [email or web address] today. She is going to have to go to court for SS. Absent  suicidal ideation. Reports compliance with medications as good . Sleep: better. Having more difficulty sleeping. She has been gripping her blanket and pillows very tight. Caffeine use: a lot of soda and tea    PREVIOUS MED TRIALS  celexa 40 mg  abilify 10    Current Substance Use:   Alcohol: none  Illicit drug use: denies   Marijuana: denies   Tobacco: denies   Vape: denies     BP: There were no vitals taken for this visit.       Review of Systems - 14 point review:  Negative except for stated    Constitutional: (fevers, chills, night sweats, wt loss/gain, change in appetite, fatigue, somnolence)    HEENT: (ear pain or discharge, hearing loss, ear ringing, sinus pressure, nosebleed, nasal discharge, sore throat, oral sores, tooth pain, bleeding gums, hoarse voice, neck pain)      Cardiovascular: (HTN, chest pain, elevated cholesterol/lipids, palpitations, leg swelling, leg pain with walking)    Respiratory: (cough, wheezing, snoring, SOB with activity (dyspnea), SOB while lying flat (orthopnea), awakening with severe SOB (paroxysmal nocturnal dyspnea))    Gastrointestinal: (NVD, constipation, abdominal pain, bright red stools, black tarry stools, stool incontinence)     Genitourinary:  (pelvic pain, burning or frequency of urination, urinary urgency, blood in urine incomplete bladder emptying, urinary incontinence, STD; MEN: testicular pain or swelling, erectile dysfunction; WOMEN: LMP, heavy menstrual bleeding (menorrhagia), irregular periods, postmenopausal bleeding, menstrual pain (dymenorrhea, vaginal discharge)    Musculoskeletal: (bone pain/fracture, joint pain or swelling, musle pain)    Integumentary: (rashes, acne, non-healing sores, itching, breast lumps, breast pain, nipple discharge, hair loss)    Neurologic: (HA, muscle weakness, paresthesias (numbness, coldness, crawling or prickling), memory loss, seizure, dizziness)    Psychiatric:  (anxiety, sadness, irritability/anger, insomnia, suicidality)    Endocrine: (heat or cold intolerance, excessive thirst (polydipsia), excessive hunger (polyphagia))    Immune/Allergic: (hives, seasonal or environmental allergies, HIV exposure)    Hematologic/Lymphatic: (lymph node enlargement, easy bleeding or bruising)    History obtained via chart review and patient    PCP is Ronald Lynn MD     Past Medical History:   Diagnosis Date    ADD (attention deficit disorder)     ADHD     Bipolar 1 disorder ([facility] [address]         Current Meds:    Prior to Admission medications    Medication Sig Start Date End Date Taking? Authorizing Provider   QUEtiapine (SEROQUEL) 25 MG tablet Take 1 tablet by mouth nightly 1/26/22  Yes ODESSA Villalta CNP   prazosin (MINIPRESS) 2 MG capsule Take 1 capsule by mouth nightly 1/26/22  Yes ODESSA Villalta CNP   lamoTRIgine (LAMICTAL) 200 MG tablet Take 1 tablet by mouth daily 1/26/22  Yes ODESSA Villalta CNP   traZODone (DESYREL) 50 MG tablet TAKE 1 TABLET BY MOUTH NIGHTLY 12/13/21   ODESSA Villalta CNP   omeprazole (PRILOSEC) 20 MG delayed release capsule TAKE ONE CAPSULE BY MOUTH EVERY DAY FOR REFLUX 4/15/21   Historical Provider, MD   norethindrone-ethinyl estradiol (Amy Money FE 1/20) 1-20 MG-MCG per tablet Take 1 tablet by mouth daily    Historical Provider, MD   metoclopramide (REGLAN) 10 MG tablet Take 1 tablet by mouth every 6 hours as needed (headache, nausea or vomiting) May cause drowsiness or diarrhea.  2/1/21   Randy Castelan MD     Social History     Socioeconomic History    Marital status: Single     Spouse name: Not on file    Number of children: Not on file    Years of education: Not on file    Highest education level: Not on file   Occupational History    Not on file   Tobacco Use    Smoking status: Never Smoker    Smokeless tobacco: Never Used   Substance and Sexual Activity    Alcohol use: Never    example, [address], [facility] suicide hotline, Francisca Martinez Hotline:      [address]: Office number: [phone]        Emergency Services Address: [address], [address] [address]        Emergency Services Phone: CRISIS LINE: [phone]        Making the environment safe: How can I make my environment (house/apartment/living space) safer? For example, can I remove guns, medications, and other items? Remove any guns/weapons from home. 2.   Remove or lock up extra medications in a secure location     Social Determinants of Health     Financial Resource Strain:     Difficulty of Paying Living Expenses: Not on file   Food Insecurity:     Worried About Running Out of Food in the Last Year: Not on file    Karime of Food in the Last Year: Not on file   Transportation Needs:     Lack of Transportation (Medical): Not on file    Lack of Transportation (Non-Medical): Not on file   Physical Activity:     Days of Exercise per Week: Not on file    Minutes of Exercise per Session: Not on file   Stress:     Feeling of Stress : Not on file   Social Connections:     Frequency of Communication with Friends and Family: Not on file    Frequency of Social Gatherings with Friends and Family: Not on file    Attends Yazidism Services: Not on file    Active Member of [address] [place] [address] or Organizations: Not on file    Attends Club or Organization Meetings: Not on file    Marital Status: Not on file   Intimate Partner Violence:     Fear of Current or Ex-Partner: Not on file    Emotionally Abused: Not on file    Physically Abused: Not on file    Sexually Abused: Not on file   Housing Stability:     Unable to Pay for Housing in the Last Year: Not on file    Number of [place] in the Last Year: Not on file    Unstable Housing in the Last Year: Not on file       MSE:  Appearance:  UTO due to phone call  Behavior: Calm, cooperative, and socially appropriate.    Speech: Normal in tone, volume, and quality. No slurring, dysarthria or pressured speech noted. Mood: \"ok\"   Affect: UTO due to phonecall  Thought Process: Appears linear, logical and goal oriented. Causality appears intact. Thought Content: Denies active suicidal and homicidal ideations. No overt delusions or paranoia appreciated. Perceptions: Denies auditory or visual hallucinations at present time. Not responding to internal stimuli. Concentration: Intact. Orientation: to person, place, date, and situation. Language: Intact. Fund of information: Intact. Memory: Recent and remote appear intact. Impulsivity: Limited. Neurovegitative: Fair appetite and sleep. Insight: Fair. Judgment: Fair. Cognition: Can spell \"world\" backwards: Yes                    Can do serial 7's:  Yes    Lab Results   Component Value Date     03/11/2019    K 3.9 03/11/2019     03/11/2019    CO2 26 03/11/2019    BUN 13 03/11/2019    CREATININE 0.7 03/11/2019    GLUCOSE 96 03/11/2019    CALCIUM 9.4 03/11/2019    PROT 8.0 03/11/2019    LABALBU 4.7 03/11/2019    BILITOT 0.5 03/11/2019    ALKPHOS 70 03/11/2019    AST 20 03/11/2019    ALT 20 03/11/2019    LABGLOM >60 03/11/2019     Lab Results   Component Value Date     03/11/2019    K 3.9 03/11/2019     03/11/2019    CO2 26 03/11/2019    BUN 13 03/11/2019    CREATININE 0.7 03/11/2019    GLUCOSE 96 03/11/2019    CALCIUM 9.4 03/11/2019      Lab Results   Component Value Date    CHOL 193 03/14/2019     Lab Results   Component Value Date    TRIG 98 03/14/2019     Lab Results   Component Value Date    HDL 47 (L) 03/14/2019     Lab Results   Component Value Date    LDLCALC 126 03/14/2019     No results found for: LABVLDL, VLDL  No results found for: [facility]  Lab Results   Component Value Date    LABA1C 5.4 03/14/2019     No results found for: EAG  Lab Results   Component Value Date    TSH 1.400 03/13/2019     Lab Results   Component Value Date    VITD25 19.9 (L) 03/13/2019     Lab Results   Component Value Date    SBSJBLRR46 0828 (H) 03/13/2019      No results found for: FOLATE     Assessment:   1. Mood disorder ([address]    2. Impulse control disorder        No evidence of acute suicidality, homicidality or psychosis observed. Patient is psychiatrically stable    Plan:    1. Continue   Atarax 25 mg 3 times a day as needed for anxiety  Minipress 2 mg at bedtime for PTSD and nightmares. Lamictal 200 mg daily for mood stabilization      Start   seroquel 25 mg nightly     Discontinue  trazodone 50 mg nightly for insomnia    The risks, benefits, side effects, indications, contraindications, and adverse effects of the medications have been discussed. Yes.  2. The pt has verbalized understanding and has capacity to give informed consent. 3. The Cheryl White report has been reviewed according to [facility] regulations. 4. Supportive therapy offered. 5. Follow up: Return in about 4 weeks (around 2/23/2022). 6. The patient has been advised to call with any problems. 7. Controlled substance Treatment Plan: NA.  8. The above listed medications have been continued, modifications in meds and other orders/labs as follows:      Orders Placed This Encounter   Medications    QUEtiapine (SEROQUEL) 25 MG tablet     Sig: Take 1 tablet by mouth nightly     Dispense:  30 tablet     Refill:  2    prazosin (MINIPRESS) 2 MG capsule     Sig: Take 1 capsule by mouth nightly     Dispense:  30 capsule     Refill:  2    lamoTRIgine (LAMICTAL) 200 MG tablet     Sig: Take 1 tablet by mouth daily     Dispense:  30 tablet     Refill:  2      No orders of the defined types were placed in this encounter. 9. Additional comments: Continue therapy, discussed sleep hygiene, discussed the use of coping skills and relaxation strategies to manage symptoms.      Trazodone- Discussed benefits, alternatives and risks involved with care, including - but not limited to - possible adverse effects of dizziness, hypotension, increased risk of falls w/ need to slowly transition between positions, excessive drowsiness, dry mouth, constipation or allergic reaction were discussed with the patient. Further discussed possibility of Serotonin Syndrome (sx including diaphoresis, agitation, muscle tension increase/rigidity, fever) with use of other serotonergic agents. Advised caution in operating vehicles/machinery after taking trazodone. 10.Over 50% of the total visit time of 12 minutes was spent on counseling and/or coordination of care of:                        1. Mood disorder ([facility] [address]    2. Impulse control disorder                      Psychotherapy Topics: mood/medication effectiveness family and health    Andriy Hardy, APRN - CNP    This dictation was generated by voice recognition computer software. Although all attempts are made to edit the dictation for accuracy, there may be errors in the transcription that are not intended.

## 2022-02-15 ENCOUNTER — TELEPHONE (OUTPATIENT)
Dept: PSYCHIATRY | Age: 22
End: 2022-02-15

## 2022-02-15 RX ORDER — HYDROXYZINE HYDROCHLORIDE 25 MG/1
25 TABLET, FILM COATED ORAL 3 TIMES DAILY PRN
Qty: 30 TABLET | Refills: 2 | Status: SHIPPED | OUTPATIENT
Start: 2022-02-15 | End: 2022-03-09 | Stop reason: SDUPTHER

## 2022-02-15 NOTE — TELEPHONE ENCOUNTER
Called and let pt's guardian know that the pt script for hydroxyzine was sent to her pharmacy    Electronically signed by Mary Cotton on 2/15/2022 at 9:27 AM

## 2022-02-15 NOTE — TELEPHONE ENCOUNTER
Pharmacy sent a request to refill pt medication. Last office visit : 1/26/2022 Lidia SAXENA  Next office visit : 3/9/2022 Lidia SAXENA    Medication fell off med list    Requested Prescriptions     Pending Prescriptions Disp Refills    hydrOXYzine (ATARAX) 25 MG tablet [Pharmacy Med Name: HYDROXYZINE HCL 25 MG TAB 25 Tablet] 30 tablet 2     Sig: TAKE 1 TABLET BY MOUTH 3 TIMES DAILY AS NEEDED FOR ANXIETY            Yoon Meade                     1/26/2022  [address] ODESSA Herrera CNP    Psychiatry  Mood disorder [facility]) +1 more    Dx  Medication Check  Follow-up    Reason for Visit       Progress Notes  ODESSA Almaraz CNP (Nurse Practitioner) [facility]  Expand All Collapse All  Dorcas Montejo is a 24 y.o. female evaluated via telephone on 1/26/2022.       Consent:  She and/or health care decision maker is aware that that she may receive a bill for this telephone service, depending on her insurance coverage, and has provided verbal consent to proceed: Yes        Documentation:  I communicated with the patient and/or health care decision maker about see below.    Details of this discussion including any medical advice provided: see below        I affirm this is a Patient Initiated Episode with a Patient who has not had a related appointment within my department in the past 7 days or scheduled within the next 24 hours.     The patient  (guardian) is aware that this is a billable service, which includes applicable co-pays.  This virtual visit was conducted with patient's (and or legal guardian's) consent.  This visit was conducted pursuant to the emergency declaration under the [place] act and the [place], 1135 waiver authority in the coronavirus preparedness and response supplemental appropriation's ACT.  Patient identification was verified and a caregiver was present when appropriate.  The patient was located in a state where the provider was licensed to provide care.     Patient identification was verified at the start of the visit: Yes     Total Time: minutes: 21-30 minutes     Note: not billable if this call serves to triage the patient into an appointment for the relevant concern     ODESSA Reed CNP       1/26/22                                          Progress Note     Am[address]                                 Chief Complaint   Patient presents with    Medication Check    Follow-up            Subjective:    Patient is a 24 y.o. female diagnosed with mood disorder and presents today for follow-up. Last seen in clinic on 10/6/2021  and prior records were reviewed.     Last visit: Doing good. Quite a bit less temper tantrums and outbursts. Sleeping a lot a better also. Taking her medication a lot more consistently she has started setting alarms on her phone to take her medications and doing them immediately. Sounds tired on the phone. States that she just woke up from a nap. She reports that family and boyfriend have noticed that she is doing much better. She has a little bit of stress because that her therapist is leaving so she is trying to find a new therapist that is in network. She denies SI HI AVH at this time she is calm and cooperative on the phone she denies side effects of medications. We discussed the importance of medication adherence as well as sleep hygiene and patient verbalized understanding. She reports that she is recovering well from [place] she sounds to have a bit of a stuffy nose on the phone but she states that it is from allergies. Today:  Feeling a little bit frazzled. She describes \"frazzled\" as anxious. She states overall she has been ok. Her grandmother passed away recently. She has not been processing the death very well. She is not in therapy or counseling. She is having insurance issues, so she is trying to get that straightened out.   She reports her and her boyfriend are doing well. She has difficulty following directions at home. She is having racing mind before bed as well as increased nightmares we discussed stopping trazodone at this time and beginning Seroquel 25 mg nightly for mood stabilization as well as insomnia and impulse control. Additionally we discussed increasing her Lamictal to 200 mg daily and increasing her prazosin to 2 mg nightly. Her mother is present on the phone with her today, her and her mother both repeated directions back to provider at this time. She denies SI HI AVH she denies side effects of medications at this time she is calm and cooperative on the phone. We revisited the importance of beginning therapy patient was given options for [facility] or [facility], patient's mother believes that they will be contacting [facility] today. She is going to have to go to court for SS.          Absent  suicidal ideation. Reports compliance with medications as good .      Sleep: better. Having more difficulty sleeping.   She has been gripping her blanket and pillows very tight.     Caffeine use: a lot of soda and tea     PREVIOUS MED TRIALS  celexa 40 mg  abilify 10     Current Substance Use:   Alcohol: none  Illicit drug use: denies   Marijuana: denies   Tobacco: denies   Vape: denies      BP: There were no vitals taken for this visit.        Review of Systems - 14 point review:  Negative except for stated     Constitutional: (fevers, chills, night sweats, wt loss/gain, change in appetite, fatigue, somnolence)     HEENT: (ear pain or discharge, hearing loss, ear ringing, sinus pressure, nosebleed, nasal discharge, sore throat, oral sores, tooth pain, bleeding gums, hoarse voice, neck pain)      Cardiovascular: (HTN, chest pain, elevated cholesterol/lipids, palpitations, leg swelling, leg pain with walking)     Respiratory: (cough, wheezing, snoring, SOB with activity (dyspnea), SOB while lying flat (orthopnea), awakening with severe SOB (paroxysmal nocturnal dyspnea))     Gastrointestinal: (NVD, constipation, abdominal pain, bright red stools, black tarry stools, stool incontinence)     Genitourinary:  (pelvic pain, burning or frequency of urination, urinary urgency, blood in urine incomplete bladder emptying, urinary incontinence, STD; MEN: testicular pain or swelling, erectile dysfunction; WOMEN: LMP, heavy menstrual bleeding (menorrhagia), irregular periods, postmenopausal bleeding, menstrual pain (dymenorrhea, vaginal discharge)     Musculoskeletal: (bone pain/fracture, joint pain or swelling, musle pain)     Integumentary: (rashes, acne, non-healing sores, itching, breast lumps, breast pain, nipple discharge, hair loss)     Neurologic: (HA, muscle weakness, paresthesias (numbness, coldness, crawling or prickling), memory loss, seizure, dizziness)     Psychiatric:  (anxiety, sadness, irritability/anger, insomnia, suicidality)     Endocrine: (heat or cold intolerance, excessive thirst (polydipsia), excessive hunger (polyphagia))     Immune/Allergic: (hives, seasonal or environmental allergies, HIV exposure)     Hematologic/Lymphatic: (lymph node enlargement, easy bleeding or bruising)     History obtained via chart review and patient     PCP is Cheryl Gayle MD      Past Medical History   Past Medical History:   Diagnosis Date    ADD (attention deficit disorder)      ADHD      [facility]              Current Meds:     Home Medications           Prior to Admission medications    Medication Sig Start Date End Date Taking?  Authorizing Provider   QUEtiapine (SEROQUEL) 25 MG tablet Take 1 tablet by mouth nightly 1/26/22   Yes ODESSA Arndt CNP   prazosin (MINIPRESS) 2 MG capsule Take 1 capsule by mouth nightly 1/26/22   Yes ODESSA Arndt CNP   lamoTRIgine (LAMICTAL) 200 MG tablet Take 1 tablet by mouth daily 1/26/22   Yes ODESSA Arndt CNP   traZODone (DESYREL) 50 MG tablet TAKE 1 TABLET BY MOUTH NIGHTLY 12/13/21     ODESSA Leon - CNP   omeprazole (PRILOSEC) 20 MG delayed release capsule TAKE ONE CAPSULE BY MOUTH EVERY DAY FOR REFLUX 4/15/21     Historical Provider, MD   norethindrone-ethinyl estradiol (JUNEL FE 1/20) 1-20 MG-MCG per tablet Take 1 tablet by mouth daily       Historical Provider, MD   metoclopramide (REGLAN) 10 MG tablet Take 1 tablet by mouth every 6 hours as needed (headache, nausea or vomiting) May cause drowsiness or diarrhea. 2/1/21     Lynda Bettencourt MD         Social History   Social History            Socioeconomic History    Marital status: Single       Spouse name: Not on file    Number of children: Not on file    Years of education: Not on file    Highest education level: Not on file   Occupational History    Not on file   Tobacco Use    Smoking status: Never Smoker    Smokeless tobacco: Never Used   Substance and Sexual Activity    Alcohol use: Never    Drug use: Never    Sexual activity: Never   Other Topics Concern    Not on file   Social History Narrative     SAFETY PLAN (completed 5/3/21)           A suicide Safety Plan is a document that supports someone when they are having thoughts of suicide.           Warning Signs that indicate a suicidal crisis may be developing: What (situations, thoughts, feelings, body sensations, behaviors, etc.) do you experience that lets you know you are beginning to think about suicide?     1. Increased feelings anxiety     2. Running fingers/hands through hair     3. Will be more quiet           Internal Coping Strategies:  What things can I do (relaxation techniques, hobbies, physical activities, etc.) to take my mind off my problems without contacting another person?     1. Writing poetry     2. Reading Fanfic     3. Watching TV           People and social settings that provide distraction: Who can I call or where can I go to distract me?     1. Name: Symone Schneider boyfrkuldeep                      Phone:  In phone     2. Name: Latina Severin, close friend                   Phone: In phone      3. Place: None currently               4. Place:            People whom I can ask for help: Who can I call when I need help - for example, friends, family, clergy, someone else?     1. Name: True Jareding boyfriend                           Phone: In phone     2. Name: Mother, Contreras Greer                            Phone: In phone     3. Name: Close friend, Latina Severin                      Phone: In phone           Professionals or [address] I can contact during a crisis: Who can I call for help - for example, my doctor, my psychiatrist, my psychologist, a mental health provider, a suicide hotline?     1. Clinician Name: [facility] Outpatient office  Phone: [phone] Option 3         Clinician Pager or Emergency Contact #: [phone]           2. Clinician Name: Grant Zurita MD Phone: [phone]         Clinician Pager or Emergency Contact #: [phone]           3. Suicide Prevention Lifeline: 3-171-776-TALK (8460)           4. [address] [place] [address] Emergency Services -  for example, [address], [facility] Hotline:                  [address]: Office number: [phone]         Emergency Services Address: [facility] [address] [id number]         Emergency Services Phone: CRISIS LINE: [phone]           Making the environment safe: How can I make my environment (house/apartment/living space) safer? For example, can I remove guns, medications, and other items?     Remove any guns/weapons from home.      2.    Remove or lock up extra medications in a secure location      Social Determinants of Health          Financial Resource Strain:     Difficulty of Paying Living Expenses: Not on file   Food Insecurity:     Worried About Running Out of Food in the Last Year: Not on file    Karime of Food in the Last Year: Not on file   Transportation Needs:     Lack of Transportation (Medical): Not on file    Lack of Transportation (Non-Medical): Not on file   Physical Activity:     Days of Exercise per Week: Not on file    Minutes of Exercise per Session: Not on file   Stress:     Feeling of Stress : Not on file   Social Connections:     Frequency of Communication with Friends and Family: Not on file    Frequency of Social Gatherings with Friends and Family: Not on file    Attends [employer] Services: Not on file    Active Member of [address] [place] [address] or Organizations: Not on file    Attends Club or Organization Meetings: Not on file    Marital Status: Not on file   Intimate Partner Violence:     Fear of Current or Ex-Partner: Not on file    Emotionally Abused: Not on file    Physically Abused: Not on file    Sexually Abused: Not on file   Housing Stability:     Unable to Pay for Housing in the Last Year: Not on file    Number of [place] in the Last Year: Not on file    Unstable Housing in the Last Year: Not on file            MSE:  Appearance:  UTO due to phone call  Behavior: Calm, cooperative, and socially appropriate. Speech: Normal in tone, volume, and quality. No slurring, dysarthria or pressured speech noted. Mood: \"ok\"   Affect: UTO due to phonecall  Thought Process: Appears linear, logical and goal oriented. Causality appears intact. Thought Content: Denies active suicidal and homicidal ideations. No overt delusions or paranoia appreciated. Perceptions: Denies auditory or visual hallucinations at present time. Not responding to internal stimuli. Concentration: Intact. Orientation: to person, place, date, and situation. Language: Intact. Fund of information: Intact. Memory: Recent and remote appear intact. Impulsivity: Limited. Neurovegitative: Fair appetite and sleep. Insight: Fair. Judgment: Fair.     Cognition: Can spell \"world\" backwards: Yes                    Can do serial 7's:  Yes           Lab Results   Component Value Date     NA 139 03/11/2019     K 3.9 03/11/2019      03/11/2019     CO2 26 03/11/2019     BUN 13 03/11/2019     CREATININE 0.7 03/11/2019     GLUCOSE 96 03/11/2019     CALCIUM 9.4 03/11/2019     PROT 8.0 03/11/2019     LABALBU 4.7 03/11/2019     BILITOT 0.5 03/11/2019     ALKPHOS 70 03/11/2019     AST 20 03/11/2019     ALT 20 03/11/2019     LABGLOM >60 03/11/2019            Lab Results   Component Value Date      03/11/2019     K 3.9 03/11/2019      03/11/2019     CO2 26 03/11/2019     BUN 13 03/11/2019     CREATININE 0.7 03/11/2019     GLUCOSE 96 03/11/2019     CALCIUM 9.4 03/11/2019            Lab Results   Component Value Date     CHOL 193 03/14/2019            Lab Results   Component Value Date     TRIG 98 03/14/2019            Lab Results   Component Value Date     HDL 47 (L) 03/14/2019            Lab Results   Component Value Date     LDLCALC 126 03/14/2019      No results found for: LABVLDL, VLDL  No results found for: [facility]  Lab Results   Component Value Date     LABA1C 5.4 03/14/2019      No results found for: EAG        Lab Results   Component Value Date     TSH 1.400 03/13/2019            Lab Results   Component Value Date     VITD25 19.9 (L) 03/13/2019            Lab Results   Component Value Date     JSQHTRFF07 1091 (H) 03/13/2019      No results found for: FOLATE      Assessment:    1. Mood disorder ([address]    2. Impulse control disorder          No evidence of acute suicidality, homicidality or psychosis observed. Patient is psychiatrically stable     Plan:     1. Continue   Atarax 25 mg 3 times a day as needed for anxiety  Minipress 2 mg at bedtime for PTSD and nightmares. Lamictal 200 mg daily for mood stabilization        Start   seroquel 25 mg nightly      Discontinue  trazodone 50 mg nightly for insomnia     The risks, benefits, side effects, indications, contraindications, and adverse effects of the medications have been discussed.  Yes.  2. The pt has verbalized understanding and has capacity to give informed consent. 3. The Irasema Gamble report has been reviewed according to [facility] regulations. 4. Supportive therapy offered. 5. Follow up:    Return in about 4 weeks (around 2/23/2022). 6. The patient has been advised to call with any problems. 7. Controlled substance Treatment Plan: NA.  8. The above listed medications have been continued, modifications in meds and other orders/labs as follows:                 Encounter Medications         Orders Placed This Encounter   Medications    QUEtiapine (SEROQUEL) 25 MG tablet       Sig: Take 1 tablet by mouth nightly       Dispense:  30 tablet       Refill:  2    prazosin (MINIPRESS) 2 MG capsule       Sig: Take 1 capsule by mouth nightly       Dispense:  30 capsule       Refill:  2    lamoTRIgine (LAMICTAL) 200 MG tablet       Sig: Take 1 tablet by mouth daily       Dispense:  30 tablet       Refill:  2                     No orders of the defined types were placed in this encounter.        9. Additional comments: Continue therapy, discussed sleep hygiene, discussed the use of coping skills and relaxation strategies to manage symptoms.      Trazodone- Discussed benefits, alternatives and risks involved with care, including - but not limited to - possible adverse effects of dizziness, hypotension, increased risk of falls w/ need to slowly transition between positions, excessive drowsiness, dry mouth, constipation or allergic reaction were discussed with the patient. Further discussed possibility of Serotonin Syndrome (sx including diaphoresis, agitation, muscle tension increase/rigidity, fever) with use of other serotonergic agents. Advised caution in operating vehicles/machinery after taking trazodone.     10. Over 50% of the total visit time of 12 minutes was spent on counseling and/or coordination of care of:                         1. Mood disorder ([facility]    2.  Impulse control disorder                        Psychotherapy Topics: mood/medication effectiveness family and health     ODESSA Loja - CNP

## 2022-03-08 ENCOUNTER — TELEPHONE (OUTPATIENT)
Dept: PSYCHIATRY | Age: 22
End: 2022-03-08

## 2022-03-08 NOTE — TELEPHONE ENCOUNTER
Called and confirmed appt with pt for [email or web address]     Electronically signed by Corbin Sparks on 3/8/2022 at 1:26 PM

## 2022-03-09 ENCOUNTER — OFFICE VISIT (OUTPATIENT)
Dept: PSYCHIATRY | Age: 22
End: 2022-03-09
Payer: MEDICAID

## 2022-03-09 VITALS
HEART RATE: 94 BPM | WEIGHT: 139.5 LBS | SYSTOLIC BLOOD PRESSURE: 114 MMHG | HEIGHT: 62 IN | BODY MASS INDEX: 25.67 KG/M2 | DIASTOLIC BLOOD PRESSURE: 74 MMHG | TEMPERATURE: 98.5 F | OXYGEN SATURATION: 98 %

## 2022-03-09 DIAGNOSIS — F63.9 IMPULSE CONTROL DISORDER: ICD-10-CM

## 2022-03-09 DIAGNOSIS — F39 MOOD DISORDER (HCC): Primary | ICD-10-CM

## 2022-03-09 PROCEDURE — 99214 OFFICE O/P EST MOD 30 MIN: CPT | Performed by: NURSE PRACTITIONER

## 2022-03-09 RX ORDER — SUMATRIPTAN 50 MG/1
50 TABLET, FILM COATED ORAL PRN
COMMUNITY
Start: 2022-02-14

## 2022-03-09 RX ORDER — HYDROXYZINE HYDROCHLORIDE 25 MG/1
25 TABLET, FILM COATED ORAL 3 TIMES DAILY PRN
Qty: 90 TABLET | Refills: 2 | Status: SHIPPED | OUTPATIENT
Start: 2022-03-09

## 2022-03-09 NOTE — PROGRESS NOTES
3/9/22         Progress Note    Bernadette Sky 2000      Chief Complaint   Patient presents with    Medication Check    Follow-up         Subjective:    Patient is a 24 y.o. female diagnosed with mood disorder and presents today for follow-up. Last seen in clinic on 1/26/22  and prior records were reviewed. Last visit: Feeling a little bit frazzled. She describes \"frazzled\" as anxious. She states overall she has been ok. Her grandmother passed away recently. She has not been processing the death very well. She is not in therapy or counseling. She is having insurance issues, so she is trying to get that straightened out. She reports her and her boyfriend are doing well. She has difficulty following directions at home. She is having racing mind before bed as well as increased nightmares we discussed stopping trazodone at this time and beginning Seroquel 25 mg nightly for mood stabilization as well as insomnia and impulse control. Additionally we discussed increasing her Lamictal to 200 mg daily and increasing her prazosin to 2 mg nightly. Her mother is present on the phone with her today, her and her mother both repeated directions back to provider at this time. She denies SI HI AVH she denies side effects of medications at this time she is calm and cooperative on the phone. We revisited the importance of beginning therapy patient was given options for [facility] or [employer], patient's mother believes that they will be contacting [facility] today. She is going to have to go to court for SS. Today:  SS court date is the 4th of April. She states she has been doing very good, but her mother is present in the interview and told her to be honest.  She reports her nightmares have been worse. Mother has concerns that Lamictal and Seroquel are making nightmares worse. She still has issues with taking her medications on time.   She has been to a psychologist for a psych evaluation for her social security. She is interested in beginning therapy here at this office. We discussed stopping minipress and increasing Seroquel to see if this alleviates nightmares. We discussed sleep hygiene and medication management. She denies si hi avh, she denies side effects of medications other than stated above. Will follow up in 2 months. Absent  suicidal ideation. Reports compliance with medications as good . Sleep:  She has been gripping her blanket and pillows very tight and clawing into her back.     Caffeine use: a lot of soda and tea    PREVIOUS MED TRIALS  celexa 40 mg  abilify 10    Current Substance Use:   Alcohol: none  Illicit drug use: denies   Marijuana: denies   Tobacco: denies   Vape: denies     BP: /74   Pulse 94   Temp 98.5 °F (36.9 °C)   Ht 5' 2\" (1.575 m)   Wt 139 lb 8 oz (63.3 kg)   SpO2 98%   BMI 25.51 kg/m²       Review of Systems - 14 point review:  Negative except for stated    Constitutional: (fevers, chills, night sweats, wt loss/gain, change in appetite, fatigue, somnolence)    HEENT: (ear pain or discharge, hearing loss, ear ringing, sinus pressure, nosebleed, nasal discharge, sore throat, oral sores, tooth pain, bleeding gums, hoarse voice, neck pain)      Cardiovascular: (HTN, chest pain, elevated cholesterol/lipids, palpitations, leg swelling, leg pain with walking)    Respiratory: (cough, wheezing, snoring, SOB with activity (dyspnea), SOB while lying flat (orthopnea), awakening with severe SOB (paroxysmal nocturnal dyspnea))    Gastrointestinal: (NVD, constipation, abdominal pain, bright red stools, black tarry stools, stool incontinence)     Genitourinary:  (pelvic pain, burning or frequency of urination, urinary urgency, blood in urine incomplete bladder emptying, urinary incontinence, STD; MEN: testicular pain or swelling, erectile dysfunction; WOMEN: LMP, heavy menstrual bleeding (menorrhagia), irregular periods, postmenopausal bleeding, menstrual pain (dymenorrhea, vaginal discharge)    Musculoskeletal: (bone pain/fracture, joint pain or swelling, musle pain)    Integumentary: (rashes, acne, non-healing sores, itching, breast lumps, breast pain, nipple discharge, hair loss)    Neurologic: (HA, muscle weakness, paresthesias (numbness, coldness, crawling or prickling), memory loss, seizure, dizziness)    Psychiatric:  (anxiety, sadness, irritability/anger, insomnia, suicidality)    Endocrine: (heat or cold intolerance, excessive thirst (polydipsia), excessive hunger (polyphagia))    Immune/Allergic: (hives, seasonal or environmental allergies, HIV exposure)    Hematologic/Lymphatic: (lymph node enlargement, easy bleeding or bruising)    History obtained via chart review and patient    PCP is Frantz Leroy MD     Past Medical History:   Diagnosis Date    ADD (attention deficit disorder)     ADHD     Bipolar 1 disorder ([facility] [address]         Current Meds:    Prior to Admission medications    Medication Sig Start Date End Date Taking?  Authorizing Provider   hydrOXYzine (ATARAX) 25 MG tablet Take 1 tablet by mouth 3 times daily as needed for Anxiety 3/9/22  Yes Oswal[facility] Valley[facility] Stager, APRN - CNP   SUMAtriptan (IMITREX) 50 MG tablet Take 50 mg by mouth as needed Take one at the start of migraine may repeat in 2 hours if needed 2/14/22   Historical Provider, MD   QUEtiapine (SEROQUEL) 25 MG tablet Take 1 tablet by mouth nightly 1/26/22   [facility] Stager, APRN - CNP   lamoTRIgine (LAMICTAL) 200 MG tablet Take 1 tablet by mouth daily 1/26/22   [facility] Stager, APRN - CNP   omeprazole (PRILOSEC) 20 MG delayed release capsule TAKE ONE CAPSULE BY MOUTH EVERY DAY FOR REFLUX 4/15/21   Historical Provider, MD   norethindrone-ethinyl estradiol (Yazan HART 1/20) 1-20 MG-MCG per tablet Take 1 tablet by mouth daily    Historical Provider, MD   metoclopramide (REGLAN) 10 MG tablet Take 1 tablet by mouth every 6 hours as needed (headache, nausea or vomiting) May cause drowsiness or diarrhea. 2/1/21   Ethan Caballero MD     Social History     Socioeconomic History    Marital status: Single     Spouse name: Not on file    Number of children: Not on file    Years of education: Not on file    Highest education level: Not on file   Occupational History    Not on file   Tobacco Use    Smoking status: Never Smoker    Smokeless tobacco: Never Used   Substance and Sexual Activity    Alcohol use: Never    Drug use: Never    Sexual activity: Never   Other Topics Concern    Not on file   Social History Narrative    SAFETY PLAN (completed 5/3/21)        A suicide Safety Plan is a document that supports someone when they are having thoughts of suicide. Warning Signs that indicate a suicidal crisis may be developing: What (situations, thoughts, feelings, body sensations, behaviors, etc.) do you experience that lets you know you are beginning to think about suicide? 1. Increased feelings anxiety    2. Running fingers/hands through hair    3. Will be more quiet        Internal Coping Strategies:  What things can I do (relaxation techniques, hobbies, physical activities, etc.) to take my mind off my problems without contacting another person? 1. Writing poetry    2. Reading Fanfic    3. Watching TV        People and social settings that provide distraction: Who can I call or where can I go to distract me? 1. Name: Dylan Hoffman roselinekuldeep  Phone: In phone    2. Name: Cassandra French, close friend  Phone: In phone     3. Place: None currently              4. Place:         People whom I can ask for help: Who can I call when I need help - for example, friends, family, clergy, someone else? 1. Name: Dylan mcfarlandfriend                 Phone: In phone    2. Name: Mother, Travon French      Phone: In phone    3. Name: Close friend, Cassandra French       Phone:  In phone        Professionals or [address] [place] [address] I can contact during a crisis: Who can I call for help - for example, my doctor, my psychiatrist, my psychologist, a mental health provider, a suicide hotline? 1. Clinician Name: [id number] Outpatient office  Phone: [phone] Option 3        Clinician Pager or Emergency Contact #: [phone]        7. Clinician Name: Maribel Naylor MD Phone: [phone]        Clinician Pager or Emergency Contact #: [phone]        3. Suicide Prevention Lifeline: 2-656-382-TALK (1011)        4. [address] [place] [address] Emergency Services -  for example, [address], [facility] suicide hotline, [facility] Hotline:      [address]: Office number: [phone]        Emergency Services Address: [facility] [address] [place], [place] [address]        Emergency Services Phone: CRISIS LINE: [phone]        Making the environment safe: How can I make my environment (house/apartment/living space) safer? For example, can I remove guns, medications, and other items? Remove any guns/weapons from home. 2.   Remove or lock up extra medications in a secure location     Social Determinants of Health     Financial Resource Strain:     Difficulty of Paying Living Expenses: Not on file   Food Insecurity:     Worried About Running Out of Food in the Last Year: Not on file    Karime of Food in the Last Year: Not on file   Transportation Needs:     Lack of Transportation (Medical): Not on file    Lack of Transportation (Non-Medical):  Not on file   Physical Activity:     Days of Exercise per Week: Not on file    Minutes of Exercise per Session: Not on file   Stress:     Feeling of Stress : Not on file   Social Connections:     Frequency of Communication with Friends and Family: Not on file    Frequency of Social Gatherings with Friends and Family: Not on file    Attends Mormon Services: Not on file    Active Member of Clubs or Organizations: Not on file    Attends Club or Organization Meetings: Not on file    Marital Status: Not on file   Intimate Partner Violence:     Fear of Current or Ex-Partner: Not on file    Emotionally Abused: Not on file    Physically Abused: Not on file    Sexually Abused: Not on file   Housing Stability:     Unable to Pay for Housing in the Last Year: Not on file    Number of [place] in the Last Year: Not on file    Unstable Housing in the Last Year: Not on file       MSE:  Appearance: Appropriately groomed. Made good eye contact. Gait stable. No abnormal movements or tremor. Behavior: Calm, cooperative, and socially appropriate. No psychomotor retardation/agitation appreciated. Speech: Normal in tone, volume, and quality. No slurring, dysarthria or pressured speech noted. Mood: \"alright\"   Affect: Mood congruent   Thought Process: Appears linear, logical and goal oriented. Causality appears intact. Thought Content: Denies active suicidal and homicidal ideations. No overt delusions or paranoia appreciated. Perceptions: Denies auditory or visual hallucinations at present time. Not responding to internal stimuli. Concentration: Intact. Orientation: to person, place, date, and situation. Language: Intact. Fund of information: Intact. Memory: Recent and remote appear intact. Impulsivity: Limited. Neurovegitative: Fair appetite and sleep. Insight: Fair. Judgment: Fair. Cognition: Can spell \"world\" backwards: Yes                    Can do serial 7's:  Yes    Lab Results   Component Value Date     03/11/2019    K 3.9 03/11/2019     03/11/2019    CO2 26 03/11/2019    BUN 13 03/11/2019    CREATININE 0.7 03/11/2019    GLUCOSE 96 03/11/2019    CALCIUM 9.4 03/11/2019    PROT 8.0 03/11/2019    LABALBU 4.7 03/11/2019    BILITOT 0.5 03/11/2019    ALKPHOS 70 03/11/2019    AST 20 03/11/2019    ALT 20 03/11/2019    LABGLOM >60 03/11/2019     Lab Results   Component Value Date     03/11/2019    K 3.9 03/11/2019     03/11/2019    CO2 26 03/11/2019    BUN 13 03/11/2019    CREATININE 0.7 03/11/2019    GLUCOSE 96 03/11/2019    CALCIUM 9.4 03/11/2019      Lab Results   Component Value Date    CHOL 193 03/14/2019     Lab Results   Component Value Date    TRIG 98 03/14/2019     Lab Results   Component Value Date    HDL 47 (L) 03/14/2019     Lab Results   Component Value Date    LDLCALC 126 03/14/2019     No results found for: LABVLDL, VLDL  No results found for: [facility]  Lab Results   Component Value Date    LABA1C 5.4 03/14/2019     No results found for: EAG  Lab Results   Component Value Date    TSH 1.400 03/13/2019     Lab Results   Component Value Date    VITD25 19.9 (L) 03/13/2019     Lab Results   Component Value Date    LLVTKILJ65 1091 (H) 03/13/2019      No results found for: FOLATE     Assessment:   1. Mood disorder ([address]    2. Impulse control disorder        No evidence of acute suicidality, homicidality or psychosis observed. Patient is psychiatrically stable    Plan:    1. Continue   Atarax 25 mg 3 times a day as needed for anxiety  Lamictal 200 mg daily for mood stabilization  seroquel 50 mg nightly    Discontinue  Minipress 2 mg at bedtime for PTSD and nightmares. The risks, benefits, side effects, indications, contraindications, and adverse effects of the medications have been discussed. Yes.  2. The pt has verbalized understanding and has capacity to give informed consent. 3. The Rodarte Ravel report has been reviewed according to [facility] regulations. 4. Supportive therapy offered. 5. Follow up: Return in about 2 months (around 5/9/2022). 6. The patient has been advised to call with any problems.   7. Controlled substance Treatment Plan: NA.  8. The above listed medications have been continued, modifications in meds and other orders/labs as follows:      Orders Placed This Encounter   Medications    hydrOXYzine (ATARAX) 25 MG tablet     Sig: Take 1 tablet by mouth 3 times daily as needed for Anxiety     Dispense:  90 tablet     Refill:  2      No orders of the defined types were placed in this encounter. 9. Additional comments: start therapy, discussed sleep hygiene, discussed the use of coping skills and relaxation strategies to manage symptoms. 10.Over 50% of the total visit time of 30 minutes was spent on counseling and/or coordination of care of:                        1. Mood disorder ([facility] [address]    2. Impulse control disorder                      Psychotherapy Topics: mood/medication effectiveness family and health    Jolly Villarreal, APRN - CNP    This dictation was generated by voice recognition computer software. Although all attempts are made to edit the dictation for accuracy, there may be errors in the transcription that are not intended.

## 2022-03-16 ENCOUNTER — OFFICE VISIT (OUTPATIENT)
Dept: PSYCHIATRY | Age: 22
End: 2022-03-16
Payer: MEDICARE

## 2022-03-16 DIAGNOSIS — F39 MOOD DISORDER (HCC): Primary | ICD-10-CM

## 2022-03-16 PROCEDURE — 90791 PSYCH DIAGNOSTIC EVALUATION: CPT

## 2022-03-16 ASSESSMENT — COLUMBIA-SUICIDE SEVERITY RATING SCALE - C-SSRS
2. HAVE YOU ACTUALLY HAD ANY THOUGHTS OF KILLING YOURSELF?: NO
6. HAVE YOU EVER DONE ANYTHING, STARTED TO DO ANYTHING, OR PREPARED TO DO ANYTHING TO END YOUR LIFE?: NO
1. WITHIN THE PAST MONTH, HAVE YOU WISHED YOU WERE DEAD OR WISHED YOU COULD GO TO SLEEP AND NOT WAKE UP?: YES

## 2022-03-16 ASSESSMENT — PATIENT HEALTH QUESTIONNAIRE - PHQ9
SUM OF ALL RESPONSES TO PHQ QUESTIONS 1-9: 13
SUM OF ALL RESPONSES TO PHQ QUESTIONS 1-9: 13
6. FEELING BAD ABOUT YOURSELF - OR THAT YOU ARE A FAILURE OR HAVE LET YOURSELF OR YOUR FAMILY DOWN: 1
7. TROUBLE CONCENTRATING ON THINGS, SUCH AS READING THE NEWSPAPER OR WATCHING TELEVISION: 2
9. THOUGHTS THAT YOU WOULD BE BETTER OFF DEAD, OR OF HURTING YOURSELF: 1
3. TROUBLE FALLING OR STAYING ASLEEP: 3
4. FEELING TIRED OR HAVING LITTLE ENERGY: 2
SUM OF ALL RESPONSES TO PHQ QUESTIONS 1-9: 13
DEPRESSION UNABLE TO ASSESS: FUNCTIONAL CAPACITY MOTIVATION LIMITS ACCURACY
10. IF YOU CHECKED OFF ANY PROBLEMS, HOW DIFFICULT HAVE THESE PROBLEMS MADE IT FOR YOU TO DO YOUR WORK, TAKE CARE OF THINGS AT HOME, OR GET ALONG WITH OTHER PEOPLE: 1
SUM OF ALL RESPONSES TO PHQ9 QUESTIONS 1 & 2: 4
5. POOR APPETITE OR OVEREATING: 0
2. FEELING DOWN, DEPRESSED OR HOPELESS: 1
SUM OF ALL RESPONSES TO PHQ QUESTIONS 1-9: 12
1. LITTLE INTEREST OR PLEASURE IN DOING THINGS: 3

## 2022-03-16 NOTE — PATIENT INSTRUCTIONS
Patient Education        Panic Attacks: Care Instructions  Overview     During a panic attack, you may have a feeling of intense fear or terror, trouble breathing, chest pain or tightness, heartbeat changes, dizziness, sweating, and shaking. A panic attack starts suddenly and usually lasts from 5 to 20 minutes but may last even longer. An attack can begin with a stressful event. Or it can happen without a cause. Although panic attacks can cause scary symptoms, you can learn to manage them with self-care, counseling, and medicine. Follow-up care is a key part of your treatment and safety. Be sure to make and go to all appointments, and call your doctor if you are having problems. It's also a good idea to know your test results and keep a list of the medicines you take. How can you care for yourself at home? · Take your medicine exactly as directed. Call your doctor if you think you are having a problem with your medicine. · Go to your counseling sessions and follow-up appointments. · Recognize and accept your anxiety. Then, when you are in a situation that makes you anxious, say to yourself, \"This is not an emergency. I feel uncomfortable, but I am not in danger. I can keep going even if I feel anxious. \"  · Be kind to your body:  ? Relieve tension with exercise or a massage. ? Get enough rest.  ? Avoid alcohol, caffeine, nicotine, and illegal drugs. They can increase your anxiety level, cause sleep problems, or trigger a panic attack. ? Learn and do relaxation techniques. See below for more about these techniques. · Engage your mind. Get out and do something you enjoy. Go to a funny movie, or take a walk or hike. Plan your day. Having too much or too little to do can make you anxious. · Keep a record of your symptoms. Discuss your fears with a good friend or family member, or join a support group for people with similar problems. Talking to others sometimes relieves stress.   · Get involved in social groups, or volunteer to help others. Being alone sometimes makes things seem worse than they are. · Get at least 30 minutes of exercise on most days of the week to relieve stress. Walking is a good choice. You also may want to do other activities, such as running, swimming, cycling, or playing tennis or team sports. Relaxation techniques  Do relaxation exercises for 10 to 20 minutes a day. You can play soothing, relaxing music while you do them, if you wish. · Tell others in your house that you are going to do your relaxation exercises. Ask them not to disturb you. · Find a comfortable place, away from all distractions and noise. · Lie down on your back, or sit with your back straight. · Focus on your breathing. Make it slow and steady. · Breathe in through your nose. Breathe out through either your nose or mouth. · Breathe deeply, filling up the area between your navel and your rib cage. Breathe so that your belly goes up and down. · Do not hold your breath. · Breathe like this for 5 to 10 minutes. Notice the feeling of calmness throughout your whole body. As you continue to breathe slowly and deeply, relax by doing the following for another 5 to 10 minutes:  · Tighten and relax each muscle group in your body. You can begin at your toes and work your way up to your head. · Imagine your muscle groups relaxing and becoming heavy. · Empty your mind of all thoughts. · Let yourself relax more and more deeply. · Become aware of the state of calmness that surrounds you. · When your relaxation time is over, you can bring yourself back to alertness by moving your fingers and toes and then your hands and feet and then stretching and moving your entire body. Sometimes people fall asleep during relaxation, but they usually wake up shortly afterward. · Always give yourself time to return to full alertness before you drive a car or do anything that might cause an accident if you are not fully alert.  Never play a relaxation tape while driving a car. When should you call for help? Call 911 anytime you think you may need emergency care. For example, call if:    · You feel you cannot stop from hurting yourself or someone else. Watch closely for changes in your health, and be sure to contact your doctor if:    · Your panic attacks get worse.     · You have new or different anxiety.     · You are not getting better as expected. Where can you learn more? Go to [email or web address] and sign in to your [employer] account. Enter H601 in the [employer] box to learn more about \"Panic Attacks: Care Instructions. \"     If you do not have an account, please click on the \"Sign Up Now\" link. Current as of: June 16, 2021               Content Version: 13.1  © 9454-9645 Healthwise, Incorporated. Care instructions adapted under license by [facility] ([facility]). If you have questions about a medical condition or this instruction, always ask your healthcare professional. [place] [address] any warranty or liability for your use of this information.
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
## 2022-04-15 ENCOUNTER — TELEPHONE (OUTPATIENT)
Dept: PSYCHIATRY | Age: 22
End: 2022-04-15

## 2022-04-15 NOTE — TELEPHONE ENCOUNTER
Pt's Mother called and wants us to send a referral to Dr. Gab Shi office. Referral sent on 4/15 @ 2:20.     Electronically signed by Sawyer Garcia MA on 4/15/2022 at 2:21 PM

## 2022-04-26 ENCOUNTER — OFFICE VISIT (OUTPATIENT)
Dept: PSYCHIATRY | Age: 22
End: 2022-04-26
Payer: MEDICAID

## 2022-04-26 DIAGNOSIS — F39 MOOD DISORDER (HCC): Primary | ICD-10-CM

## 2022-04-26 PROCEDURE — 90837 PSYTX W PT 60 MINUTES: CPT

## 2022-04-26 ASSESSMENT — PATIENT HEALTH QUESTIONNAIRE - PHQ9
SUM OF ALL RESPONSES TO PHQ QUESTIONS 1-9: 9
2. FEELING DOWN, DEPRESSED OR HOPELESS: 1
SUM OF ALL RESPONSES TO PHQ QUESTIONS 1-9: 10
SUM OF ALL RESPONSES TO PHQ QUESTIONS 1-9: 10
4. FEELING TIRED OR HAVING LITTLE ENERGY: 2
9. THOUGHTS THAT YOU WOULD BE BETTER OFF DEAD, OR OF HURTING YOURSELF: 1
10. IF YOU CHECKED OFF ANY PROBLEMS, HOW DIFFICULT HAVE THESE PROBLEMS MADE IT FOR YOU TO DO YOUR WORK, TAKE CARE OF THINGS AT HOME, OR GET ALONG WITH OTHER PEOPLE: 1
5. POOR APPETITE OR OVEREATING: 0
SUM OF ALL RESPONSES TO PHQ QUESTIONS 1-9: 10
SUM OF ALL RESPONSES TO PHQ9 QUESTIONS 1 & 2: 3
1. LITTLE INTEREST OR PLEASURE IN DOING THINGS: 2
6. FEELING BAD ABOUT YOURSELF - OR THAT YOU ARE A FAILURE OR HAVE LET YOURSELF OR YOUR FAMILY DOWN: 1
7. TROUBLE CONCENTRATING ON THINGS, SUCH AS READING THE NEWSPAPER OR WATCHING TELEVISION: 2
8. MOVING OR SPEAKING SO SLOWLY THAT OTHER PEOPLE COULD HAVE NOTICED. OR THE OPPOSITE, BEING SO FIGETY OR RESTLESS THAT YOU HAVE BEEN MOVING AROUND A LOT MORE THAN USUAL: 1

## 2022-04-26 ASSESSMENT — ANXIETY QUESTIONNAIRES
3. WORRYING TOO MUCH ABOUT DIFFERENT THINGS: 2
4. TROUBLE RELAXING: 2
IF YOU CHECKED OFF ANY PROBLEMS ON THIS QUESTIONNAIRE, HOW DIFFICULT HAVE THESE PROBLEMS MADE IT FOR YOU TO DO YOUR WORK, TAKE CARE OF THINGS AT HOME, OR GET ALONG WITH OTHER PEOPLE: SOMEWHAT DIFFICULT
GAD7 TOTAL SCORE: 9
5. BEING SO RESTLESS THAT IT IS HARD TO SIT STILL: 1
1. FEELING NERVOUS, ANXIOUS, OR ON EDGE: 1
7. FEELING AFRAID AS IF SOMETHING AWFUL MIGHT HAPPEN: 1
6. BECOMING EASILY ANNOYED OR IRRITABLE: 1
2. NOT BEING ABLE TO STOP OR CONTROL WORRYING: 1

## 2022-04-26 ASSESSMENT — COLUMBIA-SUICIDE SEVERITY RATING SCALE - C-SSRS
2. HAVE YOU ACTUALLY HAD ANY THOUGHTS OF KILLING YOURSELF?: NO
6. HAVE YOU EVER DONE ANYTHING, STARTED TO DO ANYTHING, OR PREPARED TO DO ANYTHING TO END YOUR LIFE?: NO
1. WITHIN THE PAST MONTH, HAVE YOU WISHED YOU WERE DEAD OR WISHED YOU COULD GO TO SLEEP AND NOT WAKE UP?: NO

## 2022-04-26 NOTE — PROGRESS NOTES
Therapy Progress Note  Selena Short, [facility]  4/26/2022  3:03 PM CDT   4:03 PM    Patient location  : Bl[place] MENESES location : [address] [place] [address]      Time spent with Patient: 60 minutes  This is patient's second  Therapy appointment. Reason for Consult:  depression, anxiety and stress  Referring Provider: No referring provider defined for this encounter. Amanda Mondragon ,a 24 y.o. female, for follow up visit. Pt provided informed consent for the behavioral health program. Discussed with patient model of service to include the limits of confidentiality (i.e. abuse reporting, suicide intervention, etc.) and short-term intervention focused approach. Discussed no show and late cancellation policy. Pt indicated understanding. S:  Pt is accompanied today with her mom. Pt consent given for her mom to sit in session. Pt reports she is feeling \"ok\" today. Pt reports her anger has been some better. Pt reports she just found out that her dad's birthday is on April 18 and she feels indifferent about it. Pt reports she has been stressed about her \"herbie's death\". Pt reports she worries about being left alone due to a dream she had about being left at [facility]. Today, therapist and pt completed assessments, see below. Therapist and pt discussed emotions and played an emotions game with cards. In the card game, matches had to be found, and then read and answer the question. The following questions/discussions during the card game were: Pt reports the thing that gives her the most anxiety is being around people. When pt is down about herself one positive thing is that she is loyal and still thinks about her family. Something that makes pt most angry is her mom's ex boyfriend and someone messing with her family. When pt is upset she thinks about her family. The things that make pt most happy is her mom, boyfriend, family, and [place]. When pt cannot sleep she likes to read.  Pt was given a handout of an anger thermometer. Handout was reviewed with pt. Pt encouraged to complete anger thermometer handout at home when/if she feels angry. Pt voiced understanding and agreement. Pt calm, cooperative, denies SI, HI, AVH at this time. Pt and mom requested copy of game sheet. Copy provided. Pt and mom reports she has missed appointments and received a letter that she is being dismissed so this would be their last appointment. Pt denies Suicidal Ideations, Homicidal Ideation, Auditory Hallucinations, Visual Hallucinations, Tactical Hallucinations. Assessments:  PHQ- Score: 10 ~ 0-4 Minimal. 5-9 Mild. 10-14 Moderate. 15-19 Moderately severe. 20-27 Severe. SALVADOR-7 Score: 9 ~ 0-4: minimal anxiety. 5-9: mild anxiety. 10-14: moderate anxiety. 15-21: severe anxiety  C-SSRS Suicide Screening : Patient denies any current SI.  Patient denies any current/recent thoughts to harm self and denies any current/recent thoughts of wanting to harm others    MSE:    Appearance    alert, cooperative  Appetite normal  Sleep disturbance No  Fatigue Yes  Loss of pleasure No  Impulsive behavior No  Speech    normal rate and normal volume  Mood    Anxious  Affect    normal affect  Thought Content    intact  Thought Process    slow  Associations    logical connections  Insight    Fair  Judgment    Intact  Orientation    oriented to person, place, time, and general circumstances  Memory    recent and remote memory intact  Attention/Concentration    intact  Morbid ideation No  Suicide Assessment    no suicidal ideation      History:  Social History     Socioeconomic History    Marital status: Single     Spouse name: Not on file    Number of children: Not on file    Years of education: Not on file    Highest education level: Not on file   Occupational History    Not on file   Tobacco Use    Smoking status: Never Smoker    Smokeless tobacco: Never Used   Substance and Sexual Activity    Alcohol use: Never    Drug use: Never    Sexual activity: Never   Other Topics Concern    Not on file   Social History Narrative    SAFETY PLAN (completed 5/3/21)        A suicide Safety Plan is a document that supports someone when they are having thoughts of suicide. Warning Signs that indicate a suicidal crisis may be developing: What (situations, thoughts, feelings, body sensations, behaviors, etc.) do you experience that lets you know you are beginning to think about suicide? 1. Increased feelings anxiety    2. Running fingers/hands through hair    3. Will be more quiet        Internal Coping Strategies:  What things can I do (relaxation techniques, hobbies, physical activities, etc.) to take my mind off my problems without contacting another person? 1. Writing poetry    2. Reading Fanfic    3. Watching TV        People and social settings that provide distraction: Who can I call or where can I go to distract me? 1. Name: Irlanda Pisanovandanaangeline, boyienalejandra  Phone: In phone    2. Name: Ocemerald Nelson, close friend  Phone: In phone     3. Place: None currently              4. Place:         People whom I can ask for help: Who can I call when I need help - for example, friends, family, clergy, someone else? 1. Name: Irlanda dukesienalejandra                 Phone: In phone    2. Name: Mother, Jessica Damon      Phone: In phone    3. Name: Close friend, Ocemerald Nelson       Phone: In phone        Professionals or [address] [place] [address] I can contact during a crisis: Who can I call for help - for example, my doctor, my psychiatrist, my psychologist, a mental health provider, a suicide hotline? 1. Clinician Name: [address] Outpatient office  Phone: [phone] Option 3        Clinician Pager or Emergency Contact #: [phone]        9. Clinician Name: Angela Dunham MD Phone: [phone]        Clinician Pager or Emergency Contact #: [phone] 3. Suicide Prevention Lifeline: 9-892-329-TALK (8784)        4.  [address] [place] [address] Services -  for example, [address], [facility] suicide hotline, Francisca Martinez Hotline:      [address] : Office number: [phone]        Emergency Services Address: [address], [place] [address]        Emergency Services Phone: CRISIS LINE: [phone]        Making the environment safe: How can I make my environment (house/apartment/living space) safer? For example, can I remove guns, medications, and other items? Remove any guns/weapons from home. 2.   Remove or lock up extra medications in a secure location     Social Determinants of Health     Financial Resource Strain:     Difficulty of Paying Living Expenses: Not on file   Food Insecurity:     Worried About Running Out of Food in the Last Year: Not on file    Karime of Food in the Last Year: Not on file   Transportation Needs:     Lack of Transportation (Medical): Not on file    Lack of Transportation (Non-Medical):  Not on file   Physical Activity:     Days of Exercise per Week: Not on file    Minutes of Exercise per Session: Not on file   Stress:     Feeling of Stress : Not on file   Social Connections:     Frequency of Communication with Friends and Family: Not on file    Frequency of Social Gatherings with Friends and Family: Not on file    Attends Orthodoxy Services: Not on file    Active Member of [address] [place] [address] or Organizations: Not on file    Attends Club or Organization Meetings: Not on file    Marital Status: Not on file   Intimate Partner Violence:     Fear of Current or Ex-Partner: Not on file    Emotionally Abused: Not on file    Physically Abused: Not on file    Sexually Abused: Not on file   Housing Stability:     Unable to Pay for Housing in the Last Year: Not on file    Number of [place] in the Last Year: Not on file    Unstable Housing in the Last Year: Not on file       Medications:   Current Outpatient Medications   Medication Sig Dispense Refill    SUMAtriptan (IMITREX) 50 MG tablet Take 50 mg by mouth as needed Take one at the start of migraine may repeat in 2 hours if needed      hydrOXYzine (ATARAX) 25 MG tablet Take 1 tablet by mouth 3 times daily as needed for Anxiety 90 tablet 2    QUEtiapine (SEROQUEL) 25 MG tablet Take 1 tablet by mouth nightly 30 tablet 2    lamoTRIgine (LAMICTAL) 200 MG tablet Take 1 tablet by mouth daily 30 tablet 2    omeprazole (PRILOSEC) 20 MG delayed release capsule TAKE ONE CAPSULE BY MOUTH EVERY DAY FOR REFLUX      norethindrone-ethinyl estradiol (JUNEL FE 1/20) 1-20 MG-MCG per tablet Take 1 tablet by mouth daily      metoclopramide (REGLAN) 10 MG tablet Take 1 tablet by mouth every 6 hours as needed (headache, nausea or vomiting) May cause drowsiness or diarrhea. 20 tablet 0     No current facility-administered medications for this visit. Social History:   Social History     Socioeconomic History    Marital status: Single     Spouse name: Not on file    Number of children: Not on file    Years of education: Not on file    Highest education level: Not on file   Occupational History    Not on file   Tobacco Use    Smoking status: Never Smoker    Smokeless tobacco: Never Used   Substance and Sexual Activity    Alcohol use: Never    Drug use: Never    Sexual activity: Never   Other Topics Concern    Not on file   Social History Narrative    SAFETY PLAN (completed 5/3/21)        A suicide Safety Plan is a document that supports someone when they are having thoughts of suicide. Warning Signs that indicate a suicidal crisis may be developing: What (situations, thoughts, feelings, body sensations, behaviors, etc.) do you experience that lets you know you are beginning to think about suicide? 1. Increased feelings anxiety    2. Running fingers/hands through hair    3.  Will be more quiet        Internal Coping Strategies:  What things can I do (relaxation techniques, hobbies, physical activities, etc.) to take my mind off my problems without contacting another person? 1. Writing poetry    2. Reading Fanfic    3. Watching TV        People and social settings that provide distraction: Who can I call or where can I go to distract me? 1. Name: Fabio Mahajan, boyfrienalejandra  Phone: In phone    2. Name: Sheron Amor, close friend  Phone: In phone     3. Place: None currently              4. Place:         People whom I can ask for help: Who can I call when I need help - for example, friends, family, clergy, someone else? 1. Name: Fabio Mahajan boyfrienalejandra                 Phone: In phone    2. Name: Mother, Loren Humphreys      Phone: In phone    3. Name: Close friend, Sheron Amor       Phone: In phone        Professionals or [address] [place] [address] I can contact during a crisis: Who can I call for help - for example, my doctor, my psychiatrist, my psychologist, a mental health provider, a suicide hotline? 1. Clinician Name: [address] [facility] Outpatient office  Phone: [phone] Option 3        Clinician Pager or Emergency Contact #: [phone]        8. Clinician Name: Elijah Edwards MD Phone: [phone]        Clinician Pager or Emergency Contact #: [phone]        3. Suicide Prevention Lifeline: 3-723-734-TALK (3626)        4. [address] [place] [address] Emergency Services -  for example, [address], [facility] suicide hotline, [facility] Hotline:      [address] : Office number: [phone]        Emergency Services Address: [place] [address]        Emergency Services Phone: CRISIS LINE: [phone]        Making the environment safe: How can I make my environment (house/apartment/living space) safer? For example, can I remove guns, medications, and other items? Remove any guns/weapons from home.      2.   Remove or lock up extra medications in a secure location     Social Determinants of Health     Financial Resource Strain:     Difficulty of Paying Living Expenses: Not on file   Food Insecurity:     Worried About Running Out of Food in the Last Year: Not on file    Ran Out of Food in the Last Year: Not on file   Transportation Needs:     Lack of Transportation (Medical): Not on file    Lack of Transportation (Non-Medical): Not on file   Physical Activity:     Days of Exercise per Week: Not on file    Minutes of Exercise per Session: Not on file   Stress:     Feeling of Stress : Not on file   Social Connections:     Frequency of Communication with Friends and Family: Not on file    Frequency of Social Gatherings with Friends and Family: Not on file    Attends Denominational Services: Not on file    Active Member of [address] [place] [address] [place] or Organizations: Not on file    Attends Club or Organization Meetings: Not on file    Marital Status: Not on file   Intimate Partner Violence:     Fear of Current or Ex-Partner: Not on file    Emotionally Abused: Not on file    Physically Abused: Not on file    Sexually Abused: Not on file   Housing Stability:     Unable to Pay for Housing in the Last Year: Not on file    Number of [place] in the Last Year: Not on file    Unstable Housing in the Last Year: Not on file       TOBACCO:   reports that she has never smoked. She has never used smokeless tobacco.  ETOH:   reports no history of alcohol use. Family History:   No family history on file. Diagnosis:    Mood disorder      Diagnosis Date    ADD (attention deficit disorder)     ADHD     Bipolar 1 disorder (HCC)      Problems related to the social environment    Plan:  1. Continue medication management  2. CBT to target cognitive distortions  3.  Discuss therapeutic goals    Pt interventions:  Provided handout on  anger thermometer, Provided education, Discussed self-care (sleep, nutrition, rewarding activities, social support, exercise), Established rapport, Supportive techniques, Emphasized self-care as important for managing overall health and Provided Psychoeducation re: anger and emotions      Jm Beck MS, [facility]

## 2022-05-04 RX ORDER — LAMOTRIGINE 200 MG/1
200 TABLET ORAL DAILY
Qty: 30 TABLET | Refills: 2 | OUTPATIENT
Start: 2022-05-04

## 2022-05-04 RX ORDER — QUETIAPINE FUMARATE 25 MG/1
25 TABLET, FILM COATED ORAL NIGHTLY
Qty: 30 TABLET | Refills: 2 | OUTPATIENT
Start: 2022-05-04

## 2022-05-05 NOTE — TELEPHONE ENCOUNTER
Pt's guardian called stating that pt had been dismissed from this office but was told that RXs would be covered for 30 days. Certification form shows that pt received the dismissal letter on 4/15/22. Med refills were requested earlier this week but denied. Request being sent for the refills below. Guardian stated that the Seroquel is 25 mg which is what was on the current med list-office note below shows 50 mg. Last office visit : 3/9/2022 with Kat SAXENA  Next office visit : 5/13/2022 with Kat SAXENA    Requested Prescriptions     Pending Prescriptions Disp Refills    lamoTRIgine (LAMICTAL) 200 MG tablet 30 tablet 0     Sig: Take 1 tablet by mouth daily    QUEtiapine (SEROQUEL) 25 MG tablet 30 tablet 0     Sig: Take 1 tablet by mouth nightly            eJff Galdamez RN           3/9/22                                           Progress Note     Janine Puckett 2000                                 Chief Complaint   Patient presents with    Medication Check    Follow-up            Subjective:    Patient is a 24 y.o. female diagnosed with mood disorder and presents today for follow-up. Last seen in clinic on 1/26/22  and prior records were reviewed.     Last visit: Feeling a little bit frazzled. She describes \"frazzled\" as anxious. She states overall she has been ok. Her grandmother passed away recently. She has not been processing the death very well. She is not in therapy or counseling. She is having insurance issues, so she is trying to get that straightened out. She reports her and her boyfriend are doing well. She has difficulty following directions at home. She is having racing mind before bed as well as increased nightmares we discussed stopping trazodone at this time and beginning Seroquel 25 mg nightly for mood stabilization as well as insomnia and impulse control.   Additionally we discussed increasing her Lamictal to 200 mg daily and increasing her prazosin to 2 mg nightly. Her mother is present on the phone with her today, her and her mother both repeated directions back to provider at this time. She denies SI HI AVH she denies side effects of medications at this time she is calm and cooperative on the phone. We revisited the importance of beginning therapy patient was given options for [facility] or [facility], patient's mother believes that they will be contacting [facility] today. She is going to have to go to court for SS. Today:  SS court date is the 4th of April. She states she has been doing very good, but her mother is present in the interview and told her to be honest.  She reports her nightmares have been worse. Mother has concerns that Lamictal and Seroquel are making nightmares worse. She still has issues with taking her medications on time. She has been to a psychologist for a psych evaluation for her social security. She is interested in beginning therapy here at this office. We discussed stopping minipress and increasing Seroquel to see if this alleviates nightmares. We discussed sleep hygiene and medication management. She denies si hi avh, she denies side effects of medications other than stated above. Will follow up in 2 months.         Absent  suicidal ideation.     Reports compliance with medications as good .      Sleep:  She has been gripping her blanket and pillows very tight and clawing into her back.     Caffeine use: a lot of soda and tea     PREVIOUS MED TRIALS  celexa 40 mg  abilify 10     Current Substance Use:   Alcohol: none  Illicit drug use: denies   Marijuana: denies   Tobacco: denies   Vape: denies      BP: /74   Pulse 94   Temp 98.5 °F (36.9 °C)   Ht 5' 2\" (1.575 m)   Wt 139 lb 8 oz (63.3 kg)   SpO2 98%   BMI 25.51 kg/m²         Review of Systems - 14 point review:  Negative except for stated     Constitutional: (fevers, chills, night sweats, wt loss/gain, change in appetite, fatigue, somnolence)     HEENT: (ear pain or discharge, hearing loss, ear ringing, sinus pressure, nosebleed, nasal discharge, sore throat, oral sores, tooth pain, bleeding gums, hoarse voice, neck pain)      Cardiovascular: (HTN, chest pain, elevated cholesterol/lipids, palpitations, leg swelling, leg pain with walking)     Respiratory: (cough, wheezing, snoring, SOB with activity (dyspnea), SOB while lying flat (orthopnea), awakening with severe SOB (paroxysmal nocturnal dyspnea))     Gastrointestinal: (NVD, constipation, abdominal pain, bright red stools, black tarry stools, stool incontinence)     Genitourinary:  (pelvic pain, burning or frequency of urination, urinary urgency, blood in urine incomplete bladder emptying, urinary incontinence, STD; MEN: testicular pain or swelling, erectile dysfunction; WOMEN: LMP, heavy menstrual bleeding (menorrhagia), irregular periods, postmenopausal bleeding, menstrual pain (dymenorrhea, vaginal discharge)     Musculoskeletal: (bone pain/fracture, joint pain or swelling, musle pain)     Integumentary: (rashes, acne, non-healing sores, itching, breast lumps, breast pain, nipple discharge, hair loss)     Neurologic: (HA, muscle weakness, paresthesias (numbness, coldness, crawling or prickling), memory loss, seizure, dizziness)     Psychiatric:  (anxiety, sadness, irritability/anger, insomnia, suicidality)     Endocrine: (heat or cold intolerance, excessive thirst (polydipsia), excessive hunger (polyphagia))     Immune/Allergic: (hives, seasonal or environmental allergies, HIV exposure)     Hematologic/Lymphatic: (lymph node enlargement, easy bleeding or bruising)     History obtained via chart review and patient     PCP is Mylene Guzman MD      Past Medical History        Past Medical History:   Diagnosis Date    ADD (attention deficit disorder)      ADHD      Bipolar 1 disorder (HCC)              Current Meds:     Home Medications           Prior to Admission medications Medication Sig Start Date End Date Taking? Authorizing Provider   hydrOXYzine (ATARAX) 25 MG tablet Take 1 tablet by mouth 3 times daily as needed for Anxiety 3/9/22   Yes Milagros Market, APRN - CNP   SUMAtriptan (IMITREX) 50 MG tablet Take 50 mg by mouth as needed Take one at the start of migraine may repeat in 2 hours if needed 2/14/22     Historical Provider, MD   QUEtiapine (SEROQUEL) 25 MG tablet Take 1 tablet by mouth nightly 1/26/22     Milagros Market, APRASHLEY - CNP   lamoTRIgine (LAMICTAL) 200 MG tablet Take 1 tablet by mouth daily 1/26/22     Milagros Market, APRN - CNP   omeprazole (PRILOSEC) 20 MG delayed release capsule TAKE ONE CAPSULE BY MOUTH EVERY DAY FOR REFLUX 4/15/21     Historical Provider, MD   norethindrone-ethinyl estradiol (JUNEL FE 1/20) 1-20 MG-MCG per tablet Take 1 tablet by mouth daily       Historical Provider, MD   metoclopramide (REGLAN) 10 MG tablet Take 1 tablet by mouth every 6 hours as needed (headache, nausea or vomiting) May cause drowsiness or diarrhea. 2/1/21     Marguerite Thornton MD         Social History   Social History            Socioeconomic History    Marital status: Single       Spouse name: Not on file    Number of children: Not on file    Years of education: Not on file    Highest education level: Not on file   Occupational History    Not on file   Tobacco Use    Smoking status: Never Smoker    Smokeless tobacco: Never Used   Substance and Sexual Activity    Alcohol use: Never    Drug use: Never    Sexual activity: Never   Other Topics Concern    Not on file   Social History Narrative     SAFETY PLAN (completed 5/3/21)           A suicide Safety Plan is a document that supports someone when they are having thoughts of suicide.           Warning Signs that indicate a suicidal crisis may be developing: What (situations, thoughts, feelings, body sensations, behaviors, etc.) do you experience that lets you know you are beginning to think about suicide?   1. Increased feelings anxiety     2. Running fingers/hands through hair     3. Will be more quiet           Internal Coping Strategies:  What things can I do (relaxation techniques, hobbies, physical activities, etc.) to take my mind off my problems without contacting another person?     1. Writing poetry     2. Reading Fanfic     3. Watching TV           People and social settings that provide distraction: Who can I call or where can I go to distract me?     1. Name: Ford العراقي boyfriend                      Phone: In phone     2. Name: Brian Mehta, close friend                   Phone: In phone      3. Place: None currently               4. Place:            People whom I can ask for help: Who can I call when I need help - for example, friends, family, clergy, someone else?     1. Name: Ford العراقي boyfrienalejandra                           Phone: In phone     2. Name: Mother, Ronald Lilly                            Phone: In phone     3. Name: Close friend, Brian Mehta                      Phone: In phone           Professionals or [address] [place] [address] I can contact during a crisis: Who can I call for help - for example, my doctor, my psychiatrist, my psychologist, a mental health provider, a suicide hotline?     1. Clinician Name: [id number] Outpatient office  Phone: [phone] Option 3         Clinician Pager or Emergency Contact #: [phone]           2. Clinician Name: Jarred Mcginnis MD Phone: [phone]         Clinician Pager or Emergency Contact #: [phone]           3. Suicide Prevention Lifeline: 3-001-053-TALK (9537)           4. [address] [place] [address] Emergency Services -  for example, [address], [facility] Hotline:                  [address]: Office number: [phone]         Emergency Services Address: Danelle Reeves , [address] [facility] [address] [id number]         Emergency Services Phone: CRISIS LINE: [phone]           Making the environment safe:  How can I make my environment (house/apartment/living space) safer? For example, can I remove guns, medications, and other items?     Remove any guns/weapons from home.      2. Remove or lock up extra medications in a secure location      Social Determinants of Health          Financial Resource Strain:     Difficulty of Paying Living Expenses: Not on file   Food Insecurity:     Worried About Running Out of Food in the Last Year: Not on file    Karime of Food in the Last Year: Not on file   Transportation Needs:     Lack of Transportation (Medical): Not on file    Lack of Transportation (Non-Medical): Not on file   Physical Activity:     Days of Exercise per Week: Not on file    Minutes of Exercise per Session: Not on file   Stress:     Feeling of Stress : Not on file   Social Connections:     Frequency of Communication with Friends and Family: Not on file    Frequency of Social Gatherings with Friends and Family: Not on file    Attends [employer] Services: Not on file    Active Member of [address] [place] [address] or Organizations: Not on file    Attends Club or Organization Meetings: Not on file    Marital Status: Not on file   Intimate Partner Violence:     Fear of Current or Ex-Partner: Not on file    Emotionally Abused: Not on file    Physically Abused: Not on file    Sexually Abused: Not on file   Housing Stability:     Unable to Pay for Housing in the Last Year: Not on file    Number of [place] in the Last Year: Not on file    Unstable Housing in the Last Year: Not on file            MSE:  Appearance: Appropriately groomed. Made good eye contact. Gait stable. No abnormal movements or tremor. Behavior: Calm, cooperative, and socially appropriate. No psychomotor retardation/agitation appreciated. Speech: Normal in tone, volume, and quality. No slurring, dysarthria or pressured speech noted. Mood: \"alright\"   Affect: Mood congruent   Thought Process: Appears linear, logical and goal oriented.  Causality appears intact. Thought Content: Denies active suicidal and homicidal ideations. No overt delusions or paranoia appreciated. Perceptions: Denies auditory or visual hallucinations at present time. Not responding to internal stimuli. Concentration: Intact. Orientation: to person, place, date, and situation. Language: Intact. Fund of information: Intact. Memory: Recent and remote appear intact. Impulsivity: Limited. Neurovegitative: Fair appetite and sleep. Insight: Fair. Judgment: Fair.     Cognition: Can spell \"world\" backwards: Yes                    Can do serial 7's:  Yes           Lab Results   Component Value Date      03/11/2019     K 3.9 03/11/2019      03/11/2019     CO2 26 03/11/2019     BUN 13 03/11/2019     CREATININE 0.7 03/11/2019     GLUCOSE 96 03/11/2019     CALCIUM 9.4 03/11/2019     PROT 8.0 03/11/2019     LABALBU 4.7 03/11/2019     BILITOT 0.5 03/11/2019     ALKPHOS 70 03/11/2019     AST 20 03/11/2019     ALT 20 03/11/2019     LABGLOM >60 03/11/2019            Lab Results   Component Value Date      03/11/2019     K 3.9 03/11/2019      03/11/2019     CO2 26 03/11/2019     BUN 13 03/11/2019     CREATININE 0.7 03/11/2019     GLUCOSE 96 03/11/2019     CALCIUM 9.4 03/11/2019            Lab Results   Component Value Date     CHOL 193 03/14/2019            Lab Results   Component Value Date     TRIG 98 03/14/2019            Lab Results   Component Value Date     HDL 47 (L) 03/14/2019            Lab Results   Component Value Date     LDLCALC 126 03/14/2019      No results found for: LABVLDL, VLDL  No results found for: [facility]        Lab Results   Component Value Date     LABA1C 5.4 03/14/2019      No results found for: EAG        Lab Results   Component Value Date     TSH 1.400 03/13/2019            Lab Results   Component Value Date     VITD25 19.9 (L) 03/13/2019            Lab Results   Component Value Date     JXBYUHIP67 1091 (H) 03/13/2019      No results found for: FOLATE      Assessment:    1. Mood disorder ([facility] [address]    2. Impulse control disorder          No evidence of acute suicidality, homicidality or psychosis observed. Patient is psychiatrically stable     Plan:     1. Continue   Atarax 25 mg 3 times a day as needed for anxiety  Lamictal 200 mg daily for mood stabilization  seroquel 50 mg nightly     Discontinue  Minipress 2 mg at bedtime for PTSD and nightmares.        The risks, benefits, side effects, indications, contraindications, and adverse effects of the medications have been discussed. Yes.  2. The pt has verbalized understanding and has capacity to give informed consent. 3. The Triston Sheets report has been reviewed according to [facility] regulations. 4. Supportive therapy offered. 5. Follow up:    Return in about 2 months (around 5/9/2022). 6. The patient has been advised to call with any problems. 7. Controlled substance Treatment Plan: NA.  8. The above listed medications have been continued, modifications in meds and other orders/labs as follows:                 Encounter Medications         Orders Placed This Encounter   Medications    hydrOXYzine (ATARAX) 25 MG tablet       Sig: Take 1 tablet by mouth 3 times daily as needed for Anxiety       Dispense:  90 tablet       Refill:  2                     No orders of the defined types were placed in this encounter.        9. Additional comments: start therapy, discussed sleep hygiene, discussed the use of coping skills and relaxation strategies to manage symptoms.            10. Over 50% of the total visit time of 30 minutes was spent on counseling and/or coordination of care of:                         1. Mood disorder ([facility] [address]    2.  Impulse control disorder                        Psychotherapy Topics: mood/medication effectiveness family and health     ODESSA Lester - CNP

## 2022-05-06 ENCOUNTER — TELEPHONE (OUTPATIENT)
Dept: PSYCHIATRY | Age: 22
End: 2022-05-06

## 2022-05-06 RX ORDER — LAMOTRIGINE 200 MG/1
200 TABLET ORAL DAILY
Qty: 30 TABLET | Refills: 0 | Status: SHIPPED | OUTPATIENT
Start: 2022-05-06 | End: 2022-07-17

## 2022-05-06 RX ORDER — QUETIAPINE FUMARATE 25 MG/1
25 TABLET, FILM COATED ORAL NIGHTLY
Qty: 30 TABLET | Refills: 0 | Status: SHIPPED | OUTPATIENT
Start: 2022-05-06 | End: 2022-07-17

## 2022-05-06 NOTE — TELEPHONE ENCOUNTER
Pt's guardian made aware that refill RXs for Lamotrigine and Seroquel have been sent to pt pharmacy per Keron Beverly APRN.

## 2022-05-13 ENCOUNTER — TELEPHONE (OUTPATIENT)
Dept: PSYCHIATRY | Age: 22
End: 2022-05-13

## 2022-05-13 ENCOUNTER — TELEMEDICINE (OUTPATIENT)
Dept: PSYCHIATRY | Age: 22
End: 2022-05-13
Payer: MEDICAID

## 2022-05-13 DIAGNOSIS — F63.9 IMPULSE CONTROL DISORDER: ICD-10-CM

## 2022-05-13 DIAGNOSIS — F39 MOOD DISORDER (HCC): Primary | ICD-10-CM

## 2022-05-13 PROCEDURE — 99442 PR PHYS/QHP TELEPHONE EVALUATION 11-20 MIN: CPT | Performed by: NURSE PRACTITIONER

## 2022-05-13 ASSESSMENT — PATIENT HEALTH QUESTIONNAIRE - PHQ9
1. LITTLE INTEREST OR PLEASURE IN DOING THINGS: 2
3. TROUBLE FALLING OR STAYING ASLEEP: 2
7. TROUBLE CONCENTRATING ON THINGS, SUCH AS READING THE NEWSPAPER OR WATCHING TELEVISION: 0
6. FEELING BAD ABOUT YOURSELF - OR THAT YOU ARE A FAILURE OR HAVE LET YOURSELF OR YOUR FAMILY DOWN: 1
SUM OF ALL RESPONSES TO PHQ QUESTIONS 1-9: 9
9. THOUGHTS THAT YOU WOULD BE BETTER OFF DEAD, OR OF HURTING YOURSELF: 0
4. FEELING TIRED OR HAVING LITTLE ENERGY: 1
SUM OF ALL RESPONSES TO PHQ QUESTIONS 1-9: 9
8. MOVING OR SPEAKING SO SLOWLY THAT OTHER PEOPLE COULD HAVE NOTICED. OR THE OPPOSITE, BEING SO FIGETY OR RESTLESS THAT YOU HAVE BEEN MOVING AROUND A LOT MORE THAN USUAL: 2
5. POOR APPETITE OR OVEREATING: 0
SUM OF ALL RESPONSES TO PHQ QUESTIONS 1-9: 9
SUM OF ALL RESPONSES TO PHQ9 QUESTIONS 1 & 2: 3
SUM OF ALL RESPONSES TO PHQ QUESTIONS 1-9: 9
2. FEELING DOWN, DEPRESSED OR HOPELESS: 1

## 2022-05-13 NOTE — PROGRESS NOTES
Eduardo Rosas is a 24 y.o. female evaluated via telephone on 5/13/2022. Consent:  She and/or health care decision maker is aware that that she may receive a bill for this telephone service, depending on her insurance coverage, and has provided verbal consent to proceed: Yes      Documentation:  I communicated with the patient and/or health care decision maker about see below. Details of this discussion including any medical advice provided: see below      I affirm this is a Patient Initiated Episode with a Patient who has not had a related appointment within my department in the past 7 days or scheduled within the next 24 hours. The patient  (guardian) is aware that this is a billable service, which includes applicable co-pays. This virtual visit was conducted with patient's (and or legal guardian's) consent. This visit was conducted pursuant to the emergency declaration under the [place] act and the [address] waiver authority in the coronavirus preparedness and response supplemental appropriation's ACT. Patient identification was verified and a caregiver was present when appropriate. The patient was located in a state where the provider was licensed to provide care. Patient identification was verified at the start of the visit: Yes    Total Time: minutes: 11-20 minutes    Note: not billable if this call serves to triage the patient into an appointment for the relevant concern      ODESSA Briones CNP           Progress Note    Eduardo Rosas 2000      Chief Complaint   Patient presents with    Medication Check    Follow-up     5/13/22     Subjective:    Patient is a 24 y.o. female diagnosed with mood disorder and presents today for follow-up. Last seen in clinic on 3/09/22  and prior records were reviewed. Last visit:  SS court date is the 4th of April.   She states she has been doing very good, but her mother is present in the interview and told her to be honest.  She reports her nightmares have been worse. Mother has concerns that Lamictal and Seroquel are making nightmares worse. She still has issues with taking her medications on time. She has been to a psychologist for a psych evaluation for her social security. She is interested in beginning therapy here at this office. We discussed stopping minipress and increasing Seroquel to see if this alleviates nightmares. We discussed sleep hygiene and medication management. She denies si hi avh, she denies side effects of medications other than stated above. Will follow up in 2 months. Today:  She had her court hearing went well. She has not heard any information about it yet though. She was sad on the phone she stated she was being dismissed from the clinic due to no show policy. She has another therapist and a provider lined up. She is calm and cooperative, she denies si hi avh. She was encouraged to make a strong effort with her new providers, she stated she would. Absent  suicidal ideation. Reports compliance with medications as good . Sleep:  She has been gripping her blanket and pillows very tight and clawing into her back. Caffeine use: a lot of soda and tea    PREVIOUS MED TRIALS  celexa 40 mg  abilify 10    Current Substance Use:   Alcohol: none  Illicit drug use: denies   Marijuana: denies   Tobacco: denies   Vape: denies     BP: There were no vitals taken for this visit.       Review of Systems - 14 point review:  Negative except for stated    Constitutional: (fevers, chills, night sweats, wt loss/gain, change in appetite, fatigue, somnolence)    HEENT: (ear pain or discharge, hearing loss, ear ringing, sinus pressure, nosebleed, nasal discharge, sore throat, oral sores, tooth pain, bleeding gums, hoarse voice, neck pain)      Cardiovascular: (HTN, chest pain, elevated cholesterol/lipids, palpitations, leg swelling, leg pain with walking)    Respiratory: (cough, wheezing, snoring, SOB with activity (dyspnea), SOB while lying flat (orthopnea), awakening with severe SOB (paroxysmal nocturnal dyspnea))    Gastrointestinal: (NVD, constipation, abdominal pain, bright red stools, black tarry stools, stool incontinence)     Genitourinary:  (pelvic pain, burning or frequency of urination, urinary urgency, blood in urine incomplete bladder emptying, urinary incontinence, STD; MEN: testicular pain or swelling, erectile dysfunction; WOMEN: LMP, heavy menstrual bleeding (menorrhagia), irregular periods, postmenopausal bleeding, menstrual pain (dymenorrhea, vaginal discharge)    Musculoskeletal: (bone pain/fracture, joint pain or swelling, musle pain)    Integumentary: (rashes, acne, non-healing sores, itching, breast lumps, breast pain, nipple discharge, hair loss)    Neurologic: (HA, muscle weakness, paresthesias (numbness, coldness, crawling or prickling), memory loss, seizure, dizziness)    Psychiatric:  (anxiety, sadness, irritability/anger, insomnia, suicidality)    Endocrine: (heat or cold intolerance, excessive thirst (polydipsia), excessive hunger (polyphagia))    Immune/Allergic: (hives, seasonal or environmental allergies, HIV exposure)    Hematologic/Lymphatic: (lymph node enlargement, easy bleeding or bruising)    History obtained via chart review and patient    PCP is Leanne Cui MD     Past Medical History:   Diagnosis Date    ADD (attention deficit disorder)     ADHD     Bipolar 1 disorder ([facility] [address]         Current Meds:    Prior to Admission medications    Medication Sig Start Date End Date Taking?  Authorizing Provider   lamoTRIgine (LAMICTAL) 200 MG tablet Take 1 tablet by mouth daily 5/6/22 6/5/22  ODESSA Maguire CNP   QUEtiapine (SEROQUEL) 25 MG tablet Take 1 tablet by mouth nightly 5/6/22 6/5/22  ODESSA Maguire CNP   SUMAtriptan (IMITREX) 50 MG tablet Take 50 mg by mouth as needed Take one at the start of migraine may repeat in 2 hours if needed 2/14/22   Historical Provider, MD   hydrOXYzine (ATARAX) 25 MG tablet Take 1 tablet by mouth 3 times daily as needed for Anxiety 3/9/22   ODESSA Abdalla - CNP   omeprazole (PRILOSEC) 20 MG delayed release capsule TAKE ONE CAPSULE BY MOUTH EVERY DAY FOR REFLUX 4/15/21   Historical Provider, MD   norethindrone-ethinyl estradiol (Jeanette Kelly FE 1/20) 1-20 MG-MCG per tablet Take 1 tablet by mouth daily    Historical Provider, MD   metoclopramide (REGLAN) 10 MG tablet Take 1 tablet by mouth every 6 hours as needed (headache, nausea or vomiting) May cause drowsiness or diarrhea. 2/1/21   Sloane Hernandez MD     Social History     Socioeconomic History    Marital status: Single     Spouse name: Not on file    Number of children: Not on file    Years of education: Not on file    Highest education level: Not on file   Occupational History    Not on file   Tobacco Use    Smoking status: Never Smoker    Smokeless tobacco: Never Used   Substance and Sexual Activity    Alcohol use: Never    Drug use: Never    Sexual activity: Never   Other Topics Concern    Not on file   Social History Narrative    SAFETY PLAN (completed 5/3/21)        A suicide Safety Plan is a document that supports someone when they are having thoughts of suicide. Warning Signs that indicate a suicidal crisis may be developing: What (situations, thoughts, feelings, body sensations, behaviors, etc.) do you experience that lets you know you are beginning to think about suicide? 1. Increased feelings anxiety    2. Running fingers/hands through hair    3. Will be more quiet        Internal Coping Strategies:  What things can I do (relaxation techniques, hobbies, physical activities, etc.) to take my mind off my problems without contacting another person? 1. Writing poetry    2. Reading Fanfic    3. Watching TV        People and social settings that provide distraction: Who can I call or where can I go to distract me?     1. Name: Baljeet Phoenix boyfriend  Phone: In phone    2. Name: Chaya Khalil, close friend  Phone: In phone     3. Place: None currently              4. Place:         People whom I can ask for help: Who can I call when I need help - for example, friends, family, clergy, someone else? 1. Name: Baljeet Pohenix boyfriend                 Phone: In phone    2. Name: Mother, Anant Rodarte      Phone: In phone    3. Name: Close friend, Chaya Khalil       Phone: In phone        Professionals or [address] [place] [address] I can contact during a crisis: Who can I call for help - for example, my doctor, my psychiatrist, my psychologist, a mental health provider, a suicide hotline? 1. Clinician Name: [id number] Outpatient office  Phone: [phone] Option 3        Clinician Pager or Emergency Contact #: [phone]        4. Clinician Name: Catina Brock MD Phone: [phone]        Clinician Pager or Emergency Contact #: [phone]        2. Suicide Prevention Lifeline: 4-497-881-TALK (4533)        4. [address] [place] [address] Emergency Services -  for example, [address], [facility] suicide hotline, [facility] Hotline:      [address]: Office number: [phone]        Emergency Services Address: Danelle Reeves , [address] [id number]        Emergency Services Phone: CRISIS LINE: [phone]        Making the environment safe: How can I make my environment (house/apartment/living space) safer? For example, can I remove guns, medications, and other items? Remove any guns/weapons from home. 2.   Remove or lock up extra medications in a secure location     Social Determinants of Health     Financial Resource Strain:     Difficulty of Paying Living Expenses: Not on file   Food Insecurity:     Worried About Running Out of Food in the Last Year: Not on file    Karime of Food in the Last Year: Not on file   Transportation Needs:     Lack of Transportation (Medical):  Not on file    Lack of Transportation (Non-Medical): Not on file   Physical Activity:     Days of Exercise per Week: Not on file    Minutes of Exercise per Session: Not on file   Stress:     Feeling of Stress : Not on file   Social Connections:     Frequency of Communication with Friends and Family: Not on file    Frequency of Social Gatherings with Friends and Family: Not on file    Attends Muslim Services: Not on file    Active Member of [address] [place] [address] or Organizations: Not on file    Attends Club or Organization Meetings: Not on file    Marital Status: Not on file   Intimate Partner Violence:     Fear of Current or Ex-Partner: Not on file    Emotionally Abused: Not on file    Physically Abused: Not on file    Sexually Abused: Not on file   Housing Stability:     Unable to Pay for Housing in the Last Year: Not on file    Number of [place] in the Last Year: Not on file    Unstable Housing in the Last Year: Not on file       MSE:  Appearance and gait: UTO due to phone call   Behavior: Calm, cooperative, and socially appropriate. Nita  Speech: Normal in tone, volume, and quality. No slurring, dysarthria or pressured speech noted. Mood: \"ok\"   Affect: UTO due to phone call   Thought Process: Appears linear, logical and goal oriented. Causality appears intact. Thought Content: Denies active suicidal and homicidal ideations. No overt delusions or paranoia appreciated. Perceptions: Denies auditory or visual hallucinations at present time. Not responding to internal stimuli. Concentration: Intact. Orientation: to person, place, date, and situation. Language: Intact. Fund of information: Intact. Memory: Recent and remote appear intact. Impulsivity: Limited. Neurovegitative: Fair appetite and sleep. Insight: Fair. Judgment: Fair. Cognition: Can spell \"world\" backwards: Yes                    Can do serial 7's:  Yes    Lab Results   Component Value Date     03/11/2019    K 3.9 03/11/2019     03/11/2019    CO2 26 03/11/2019 BUN 13 03/11/2019    CREATININE 0.7 03/11/2019    GLUCOSE 96 03/11/2019    CALCIUM 9.4 03/11/2019    PROT 8.0 03/11/2019    LABALBU 4.7 03/11/2019    BILITOT 0.5 03/11/2019    ALKPHOS 70 03/11/2019    AST 20 03/11/2019    ALT 20 03/11/2019    LABGLOM >60 03/11/2019     Lab Results   Component Value Date     03/11/2019    K 3.9 03/11/2019     03/11/2019    CO2 26 03/11/2019    BUN 13 03/11/2019    CREATININE 0.7 03/11/2019    GLUCOSE 96 03/11/2019    CALCIUM 9.4 03/11/2019      Lab Results   Component Value Date    CHOL 193 03/14/2019     Lab Results   Component Value Date    TRIG 98 03/14/2019     Lab Results   Component Value Date    HDL 47 (L) 03/14/2019     Lab Results   Component Value Date    LDLCALC 126 03/14/2019     No results found for: LABVLDL, VLDL  No results found for: [facility]  Lab Results   Component Value Date    LABA1C 5.4 03/14/2019     No results found for: EAG  Lab Results   Component Value Date    TSH 1.400 03/13/2019     Lab Results   Component Value Date    VITD25 19.9 (L) 03/13/2019     Lab Results   Component Value Date    XWNKBRJR60 1091 (H) 03/13/2019      No results found for: FOLATE     Assessment:   1. Mood disorder ([facility] [address]    2. Impulse control disorder        No evidence of acute suicidality, homicidality or psychosis observed. Patient is psychiatrically stable    Plan:    1. Continue   Atarax 25 mg 3 times a day as needed for anxiety  Lamictal 200 mg daily for mood stabilization    Discontinue  seroquel 50 mg nightly      The risks, benefits, side effects, indications, contraindications, and adverse effects of the medications have been discussed. Yes.  2. The pt has verbalized understanding and has capacity to give informed consent. 3. The Dellis Riding report has been reviewed according to [facility] regulations. 4. Supportive therapy offered. 5. Follow up: No follow-ups on file. 6. The patient has been advised to call with any problems.   7. Controlled substance Treatment Plan: NA.  8. The above listed medications have been continued, modifications in meds and other orders/labs as follows: No orders of the defined types were placed in this encounter. No orders of the defined types were placed in this encounter. 9. Additional comments: start therapy, discussed sleep hygiene, discussed the use of coping skills and relaxation strategies to manage symptoms. 10.Over 50% of the total visit time of 12 minutes was spent on counseling and/or coordination of care of:                        1. Mood disorder ([facility]    2. Impulse control disorder                      Psychotherapy Topics: mood/medication effectiveness family and health    Heidi Lee, APRN - CNP    This dictation was generated by voice recognition computer software. Although all attempts are made to edit the dictation for accuracy, there may be errors in the transcription that are not intended.

## 2022-05-13 NOTE — TELEPHONE ENCOUNTER
Pt was called for virtual appointment check in.       Electronically signed by Skinny Pederson MA on 5/13/2022 at 2:03 PM

## 2022-07-17 ENCOUNTER — APPOINTMENT (OUTPATIENT)
Dept: CT IMAGING | Age: 22
End: 2022-07-17
Payer: MEDICARE

## 2022-07-17 ENCOUNTER — HOSPITAL ENCOUNTER (EMERGENCY)
Age: 22
Discharge: HOME | End: 2022-07-17
Attending: EMERGENCY MEDICINE
Payer: MEDICARE

## 2022-07-17 VITALS
RESPIRATION RATE: 16 BRPM | WEIGHT: 138 LBS | DIASTOLIC BLOOD PRESSURE: 79 MMHG | SYSTOLIC BLOOD PRESSURE: 107 MMHG | BODY MASS INDEX: 25.4 KG/M2 | OXYGEN SATURATION: 96 % | HEIGHT: 62 IN | HEART RATE: 82 BPM | TEMPERATURE: 98.1 F

## 2022-07-17 DIAGNOSIS — K52.9 COLITIS: ICD-10-CM

## 2022-07-17 DIAGNOSIS — R19.7 ABDOMINAL PAIN, VOMITING, AND DIARRHEA: ICD-10-CM

## 2022-07-17 DIAGNOSIS — J06.9 UPPER RESPIRATORY TRACT INFECTION, UNSPECIFIED TYPE: Primary | ICD-10-CM

## 2022-07-17 DIAGNOSIS — R10.9 ABDOMINAL PAIN, VOMITING, AND DIARRHEA: ICD-10-CM

## 2022-07-17 DIAGNOSIS — N39.0 URINARY TRACT INFECTION WITHOUT HEMATURIA, SITE UNSPECIFIED: ICD-10-CM

## 2022-07-17 DIAGNOSIS — R11.10 ABDOMINAL PAIN, VOMITING, AND DIARRHEA: ICD-10-CM

## 2022-07-17 LAB
ALBUMIN SERPL-MCNC: 5 G/DL (ref 3.5–5.2)
ALP BLD-CCNC: 101 U/L (ref 35–104)
ALT SERPL-CCNC: 34 U/L (ref 5–33)
ANION GAP SERPL CALCULATED.3IONS-SCNC: 13 MMOL/L (ref 7–19)
AST SERPL-CCNC: 25 U/L (ref 5–32)
BACTERIA: ABNORMAL /HPF
BILIRUB SERPL-MCNC: 0.4 MG/DL (ref 0.2–1.2)
BILIRUBIN URINE: ABNORMAL
BLOOD, URINE: NEGATIVE
BUN BLDV-MCNC: 11 MG/DL (ref 6–20)
CALCIUM SERPL-MCNC: 9.6 MG/DL (ref 8.6–10)
CHLORIDE BLD-SCNC: 102 MMOL/L (ref 98–111)
CLARITY: CLEAR
CO2: 25 MMOL/L (ref 22–29)
COLOR: ABNORMAL
CREAT SERPL-MCNC: 0.7 MG/DL (ref 0.5–0.9)
CRYSTALS, UA: ABNORMAL /HPF
EPITHELIAL CELLS, UA: ABNORMAL /HPF
GFR AFRICAN AMERICAN: >59
GFR NON-AFRICAN AMERICAN: >60
GLUCOSE BLD-MCNC: 116 MG/DL (ref 74–109)
GLUCOSE URINE: NEGATIVE MG/DL
HCG(URINE) PREGNANCY TEST: NEGATIVE
HCT VFR BLD CALC: 44.4 % (ref 37–47)
HEMOGLOBIN: 14.3 G/DL (ref 12–16)
KETONES, URINE: ABNORMAL MG/DL
LEUKOCYTE ESTERASE, URINE: ABNORMAL
LIPASE: 73 U/L (ref 13–60)
MCH RBC QN AUTO: 28.8 PG (ref 27–31)
MCHC RBC AUTO-ENTMCNC: 32.2 G/DL (ref 33–37)
MCV RBC AUTO: 89.5 FL (ref 81–99)
NITRITE, URINE: NEGATIVE
PDW BLD-RTO: 13.1 % (ref 11.5–14.5)
PH UA: 5 (ref 5–8)
PLATELET # BLD: 347 K/UL (ref 130–400)
PMV BLD AUTO: 9.9 FL (ref 9.4–12.3)
POTASSIUM REFLEX MAGNESIUM: 3.8 MMOL/L (ref 3.5–5)
PROTEIN UA: ABNORMAL MG/DL
RBC # BLD: 4.96 M/UL (ref 4.2–5.4)
RBC UA: ABNORMAL /HPF (ref 0–2)
SARS-COV-2, NAAT: NOT DETECTED
SODIUM BLD-SCNC: 140 MMOL/L (ref 136–145)
SPECIFIC GRAVITY UA: 1.03 (ref 1–1.03)
TOTAL PROTEIN: 8 G/DL (ref 6.6–8.7)
UROBILINOGEN, URINE: 1 E.U./DL
WBC # BLD: 8.4 K/UL (ref 4.8–10.8)
WBC UA: ABNORMAL /HPF (ref 0–5)
YEAST: PRESENT /HPF

## 2022-07-17 PROCEDURE — 80053 COMPREHEN METABOLIC PANEL: CPT

## 2022-07-17 PROCEDURE — 84703 CHORIONIC GONADOTROPIN ASSAY: CPT

## 2022-07-17 PROCEDURE — 99285 EMERGENCY DEPT VISIT HI MDM: CPT

## 2022-07-17 PROCEDURE — 36415 COLL VENOUS BLD VENIPUNCTURE: CPT

## 2022-07-17 PROCEDURE — 74177 CT ABD & PELVIS W/CONTRAST: CPT

## 2022-07-17 PROCEDURE — 96375 TX/PRO/DX INJ NEW DRUG ADDON: CPT

## 2022-07-17 PROCEDURE — 2580000003 HC RX 258: Performed by: EMERGENCY MEDICINE

## 2022-07-17 PROCEDURE — 6360000004 HC RX CONTRAST MEDICATION: Performed by: EMERGENCY MEDICINE

## 2022-07-17 PROCEDURE — 74177 CT ABD & PELVIS W/CONTRAST: CPT | Performed by: RADIOLOGY

## 2022-07-17 PROCEDURE — 6360000002 HC RX W HCPCS: Performed by: EMERGENCY MEDICINE

## 2022-07-17 PROCEDURE — 85027 COMPLETE CBC AUTOMATED: CPT

## 2022-07-17 PROCEDURE — 81001 URINALYSIS AUTO W/SCOPE: CPT

## 2022-07-17 PROCEDURE — 87635 SARS-COV-2 COVID-19 AMP PRB: CPT

## 2022-07-17 PROCEDURE — 96374 THER/PROPH/DIAG INJ IV PUSH: CPT

## 2022-07-17 PROCEDURE — 83690 ASSAY OF LIPASE: CPT

## 2022-07-17 PROCEDURE — 87086 URINE CULTURE/COLONY COUNT: CPT

## 2022-07-17 RX ORDER — ONDANSETRON 2 MG/ML
4 INJECTION INTRAMUSCULAR; INTRAVENOUS ONCE
Status: COMPLETED | OUTPATIENT
Start: 2022-07-17 | End: 2022-07-17

## 2022-07-17 RX ORDER — SODIUM CHLORIDE 9 MG/ML
INJECTION, SOLUTION INTRAVENOUS CONTINUOUS
Status: DISCONTINUED | OUTPATIENT
Start: 2022-07-17 | End: 2022-07-17 | Stop reason: HOSPADM

## 2022-07-17 RX ORDER — METRONIDAZOLE 500 MG/1
500 TABLET ORAL 3 TIMES DAILY
Qty: 21 TABLET | Refills: 0 | Status: SHIPPED | OUTPATIENT
Start: 2022-07-17 | End: 2022-07-24

## 2022-07-17 RX ORDER — FLUCONAZOLE 150 MG/1
150 TABLET ORAL ONCE
Qty: 1 TABLET | Refills: 0 | Status: SHIPPED | OUTPATIENT
Start: 2022-07-17 | End: 2022-07-17

## 2022-07-17 RX ORDER — ONDANSETRON 4 MG/1
4 TABLET, ORALLY DISINTEGRATING ORAL EVERY 8 HOURS PRN
Qty: 10 TABLET | Refills: 0 | Status: SHIPPED | OUTPATIENT
Start: 2022-07-17

## 2022-07-17 RX ORDER — LEVOFLOXACIN 750 MG/1
750 TABLET ORAL DAILY
Qty: 7 TABLET | Refills: 0 | Status: SHIPPED | OUTPATIENT
Start: 2022-07-17 | End: 2022-07-24

## 2022-07-17 RX ADMIN — IOPAMIDOL 70 ML: 755 INJECTION, SOLUTION INTRAVENOUS at 05:20

## 2022-07-17 RX ADMIN — SODIUM CHLORIDE: 9 INJECTION, SOLUTION INTRAVENOUS at 05:01

## 2022-07-17 RX ADMIN — ONDANSETRON 4 MG: 2 INJECTION INTRAMUSCULAR; INTRAVENOUS at 04:55

## 2022-07-17 RX ADMIN — WATER 1000 MG: 1 INJECTION INTRAMUSCULAR; INTRAVENOUS; SUBCUTANEOUS at 05:44

## 2022-07-17 ASSESSMENT — ENCOUNTER SYMPTOMS
COUGH: 1
ABDOMINAL PAIN: 1
TROUBLE SWALLOWING: 0
SORE THROAT: 0
BLOOD IN STOOL: 0
RHINORRHEA: 1
VOICE CHANGE: 0
DIARRHEA: 1
EYE PAIN: 0
VOMITING: 1
SHORTNESS OF BREATH: 0
NAUSEA: 1

## 2022-07-17 ASSESSMENT — PAIN - FUNCTIONAL ASSESSMENT: PAIN_FUNCTIONAL_ASSESSMENT: 0-10

## 2022-07-17 ASSESSMENT — PAIN SCALES - GENERAL: PAINLEVEL_OUTOF10: 3

## 2022-07-17 ASSESSMENT — PAIN DESCRIPTION - LOCATION: LOCATION: ABDOMEN

## 2022-07-17 ASSESSMENT — PAIN DESCRIPTION - ORIENTATION: ORIENTATION: LOWER

## 2022-07-17 NOTE — ED PROVIDER NOTES
[facility] EMERGENCY DEPT  eMERGENCY dEPARTMENT eNCOUnter      Pt Name: Sotero Strong  MRN: [id number]  [place] 2000  Date of evaluation: 7/17/2022  Provider: Hannah Kamara MD    CHIEF COMPLAINT       Chief Complaint   Patient presents with    Abdominal Pain     Pt states it is her lower stomach that hurts, pts mother states vomiting and diarrhea started tonight     Nasal Congestion         HISTORY OF PRESENT ILLNESS   (Location/Symptom, Timing/Onset,Context/Setting, Quality, Duration, Modifying Factors, Severity)  Note limiting factors. Sotero Strong is a 24 y.o. female who presents to the emergency department with multiple complaints. Has had nasal congestion for a few days. No cough or trouble breathing. No chest pain. No sore throat. Today began having abdominal pain nausea vomiting diarrhea. No hematemesis or blood in stools. No urinary complaints. No vaginal discharge or bleeding. No fevers. No headache or neck pain or neck stiffness. HPI    NursingNotes were reviewed. REVIEW OF SYSTEMS    (2-9 systems for level 4, 10 or more for level 5)     Review of Systems   Constitutional:  Negative for fever. HENT:  Positive for congestion and rhinorrhea. Negative for sore throat, trouble swallowing and voice change. Eyes:  Negative for pain and visual disturbance. Respiratory:  Positive for cough (very mild). Negative for shortness of breath. Cardiovascular:  Negative for chest pain and palpitations. Gastrointestinal:  Positive for abdominal pain, diarrhea, nausea and vomiting. Negative for blood in stool. Genitourinary:  Negative for difficulty urinating, dysuria, pelvic pain, vaginal bleeding, vaginal discharge and vaginal pain. Musculoskeletal:  Negative for neck pain and neck stiffness. Skin:  Negative for rash. Neurological:  Negative for weakness and headaches. All other systems reviewed and are negative.     A complete review of systems was performed and is negative except as noted above in the HPI. PAST MEDICAL HISTORY     Past Medical History:   Diagnosis Date    ADD (attention deficit disorder)     ADHD     Bipolar 1 disorder ([facility]          SURGICAL HISTORY     History reviewed. No pertinent surgical history. CURRENT MEDICATIONS       Discharge Medication List as of 7/17/2022  7:41 AM        CONTINUE these medications which have NOT CHANGED    Details   Cholecalciferol (VITAMIN D3) 125 MCG (5000 UT) TABS Take 2 each by mouthHistorical Med      sertraline (ZOLOFT) 50 MG tablet Take 50 mg by mouth in the morning. Historical Med      medroxyPROGESTERone Acetate (DEPO-PROVERA IM) Inject into the muscleHistorical Med      lamoTRIgine (LAMICTAL) 200 MG tablet Take 1 tablet by mouth daily, Disp-30 tablet, R-0Normal      SUMAtriptan (IMITREX) 50 MG tablet Take 50 mg by mouth as needed Take one at the start of migraine may repeat in 2 hours if neededHistorical Med      hydrOXYzine (ATARAX) 25 MG tablet Take 1 tablet by mouth 3 times daily as needed for Anxiety, Disp-90 tablet, R-2Normal      omeprazole (PRILOSEC) 20 MG delayed release capsule TAKE ONE CAPSULE BY MOUTH EVERY DAY FOR REFLUXHistorical Med      metoclopramide (REGLAN) 10 MG tablet Take 1 tablet by mouth every 6 hours as needed (headache, nausea or vomiting) May cause drowsiness or diarrhea., Disp-20 tablet, R-0Print             ALLERGIES     Patient has no known allergies. FAMILY HISTORY     History reviewed. No pertinent family history.        SOCIAL HISTORY       Social History     Socioeconomic History    Marital status: Single     Spouse name: None    Number of children: None    Years of education: None    Highest education level: None   Tobacco Use    Smoking status: Never    Smokeless tobacco: Never   Substance and Sexual Activity    Alcohol use: Never    Drug use: Never    Sexual activity: Never   Social History Narrative    SAFETY PLAN (completed 5/3/21)        A suicide Safety Plan is a document that supports someone when they are having thoughts of suicide. Warning Signs that indicate a suicidal crisis may be developing: What (situations, thoughts, feelings, body sensations, behaviors, etc.) do you experience that lets you know you are beginning to think about suicide? 1. Increased feelings anxiety    2. Running fingers/hands through hair    3. Will be more quiet        Internal Coping Strategies:  What things can I do (relaxation techniques, hobbies, physical activities, etc.) to take my mind off my problems without contacting another person? 1. Writing poetry    2. Reading Fanfic    3. Watching TV        People and social settings that provide distraction: Who can I call or where can I go to distract me? 1. Name: roseline Kilgoreiend  Phone: In phone    2. Name: Krysta Brock, close friend  Phone: In phone     3. Place: None currently              4. Place:         People whom I can ask for help: Who can I call when I need help - for example, friends, family, clergy, someone else? 1. Name: Jose Martin Haynes boyfriend                 Phone: In phone    2. Name: Mother, Ally Wick      Phone: In phone    3. Name: Close friend, Krysta Brock       Phone: In phone        Professionals or [address] [place] [address] I can contact during a crisis: Who can I call for help - for example, my doctor, my psychiatrist, my psychologist, a mental health provider, a suicide hotline? 1. Clinician Name: [address] [facility] Outpatient office  Phone: [phone] Option 3        Clinician Pager or Emergency Contact #: [phone]        6. Clinician Name: Pardeep Colunga MD Phone: [phone]        Clinician Pager or Emergency Contact #: [phone]        5. Suicide Prevention Lifeline: 5-550-106-TALK (0394)        4.  [address] [place] [address] Emergency Services -  for example, [address] suicide hotline, [facility] Hotline:      [address]: Office number: [phone]        Emergency Services Address: [address] [facility]        Emergency Services Phone: CRISIS LINE: [phone]        Making the environment safe: How can I make my environment (house/apartment/living space) safer? For example, can I remove guns, medications, and other items? Remove any guns/weapons from home. 2.   Remove or lock up extra medications in a secure location       SCREENINGS    Rosa Coma Scale  Eye Opening: Spontaneous  Best Verbal Response: Oriented  Best Motor Response: Obeys commands  [place] Coma Scale Score: 15        PHYSICAL EXAM    (up to 7 for level 4, 8 or more for level 5)     ED Triage Vitals [07/17/22 0426]   BP Temp Temp src Heart Rate Resp SpO2 Height Weight   117/86 97.8 °F (36.6 °C) -- 92 20 94 % 5' 2\" (1.575 m) 138 lb (62.6 kg)       Physical Exam  Vitals reviewed. Constitutional:       General: She is not in acute distress. Appearance: She is well-developed. HENT:      Head: Normocephalic and atraumatic. Right Ear: Tympanic membrane, ear canal and external ear normal.      Left Ear: Tympanic membrane, ear canal and external ear normal.      Mouth/Throat:      Mouth: Mucous membranes are moist.      Pharynx: Oropharynx is clear. No oropharyngeal exudate or posterior oropharyngeal erythema. Eyes:      General: No scleral icterus. Right eye: No discharge. Left eye: No discharge. Conjunctiva/sclera: Conjunctivae normal.      Pupils: Pupils are equal, round, and reactive to light. Neck:      Vascular: No JVD. Cardiovascular:      Rate and Rhythm: Normal rate and regular rhythm. Pulses: Normal pulses. Heart sounds: Normal heart sounds. Pulmonary:      Effort: Pulmonary effort is normal. No respiratory distress. Breath sounds: Normal breath sounds. No wheezing, rhonchi or rales. Abdominal:      General: There is no distension. Palpations: Abdomen is soft. There is no pulsatile mass. Tenderness:  There is abdominal tenderness in the periumbilical area. There is no guarding or rebound. Musculoskeletal:         General: No tenderness. Cervical back: Normal range of motion and neck supple. No rigidity. Right lower leg: No edema. Left lower leg: No edema. Lymphadenopathy:      Cervical: No cervical adenopathy. Skin:     General: Skin is warm and dry. Capillary Refill: Capillary refill takes less than 2 seconds. Neurological:      General: No focal deficit present. Mental Status: She is alert and oriented to person, place, and time. Psychiatric:         Mood and Affect: Mood normal.         Behavior: Behavior normal.       DIAGNOSTIC RESULTS     EKG: All EKG's are interpreted by the Emergency Department Physician who either signs or Co-signs this chart in the absence of a cardiologist.        RADIOLOGY:   Non-plain film images such as CT, Ultrasound and MRI are read by the radiologist. [place] images are visualized and preliminarily interpreted by the emergency physician with the below findings:        Interpretation per the Radiologist below, if available at the time of this note:    CT ABDOMEN PELVIS W IV CONTRAST Additional Contrast? None   Final Result   1. Possible descending and rectosigmoid colitis as above versus artifact from under distention. Recommend clinical correlation   2. Remainder is unremarkable   Recommendation: Follow up as clinically indicated. All CT scans at this facility utilize dose modulation, iterative reconstruction, and/or weight based dosing when appropriate to reduce radiation dose to as low as reasonably achievable.    Electronically Signed by Rad Stone DO at 17-Jul-2022 06:57:00 AM                     ED BEDSIDE ULTRASOUND:   Performed by ED Physician - none    LABS:  Labs Reviewed   CBC - Abnormal; Notable for the following components:       Result Value    MCHC 32.2 (*)     All other components within normal limits   COMPREHENSIVE METABOLIC PANEL W/ REFLEX TO MG FOR LOW K - Abnormal; Notable for the following components:    Glucose 116 (*)     ALT 34 (*)     All other components within normal limits   LIPASE - Abnormal; Notable for the following components:    Lipase 73 (*)     All other components within normal limits   URINALYSIS WITH REFLEX TO CULTURE - Abnormal; Notable for the following components:    Color, UA DARK YELLOW (*)     Bilirubin Urine SMALL (*)     Ketones, Urine TRACE (*)     Protein, UA TRACE (*)     Leukocyte Esterase, Urine MODERATE (*)     All other components within normal limits   MICROSCOPIC URINALYSIS - Abnormal; Notable for the following components:    WBC, UA 16-20 (*)     Bacteria, UA 2+ (*)     Yeast, UA Present (*)     Crystals, UA NEG (*)     All other components within normal limits   CULTURE, URINE    Narrative:     ORDER#: [id number]                          ORDERED BY: Delphine Jackson  SOURCE: Urine Clean Catch                  COLLECTED:  07/17/22 04:24  ANTIBIOTICS AT [facility].:                      RECEIVED :  07/17/22 04:45   COVID-19, RAPID   PREGNANCY, URINE       All other labs were within normal range or not returned as of this dictation. EMERGENCY DEPARTMENT COURSE and DIFFERENTIALDIAGNOSIS/MDM:   Vitals:    Vitals:    07/17/22 0426 07/17/22 0746   BP: 117/86 107/79   Pulse: 92 82   Resp: 20 16   Temp: 97.8 °F (36.6 °C) 98.1 °F (36.7 °C)   TempSrc:  Oral   SpO2: 94% 96%   Weight: 138 lb (62.6 kg)    Height: 5' 2\" (1.575 m)        MDM  Patient resting comfortably nontoxic on exam.  Symptoms improved. Tolerating oral intake. No vomiting or diarrhea. Question of colitis on CT. Think more likely this is artifact from underdistention but cannot be certain. Suspect UTI. Will cover with empiric antibiotics and include Flagyl in case she does have colitis. She stable for discharge. I do not see indication for admission at this time. We will also give a dose of Diflucan as of late she has some yeast in her urine. Discussed all results with patient and her mother including imaging and labs. Labs were pretty unremarkable. Liver enzymes and lipase and glucose all slightly elevated and urinalysis looks like she probably has UTI. Labs otherwise unremarkable. Suspect her congestion is probably due to viral URI but cannot be certain about this. Lungs clear. See no indication for chest x-ray at this time. Stable for discharge. Told mother to have patient follow-up with her primary care provider for recheck and return to the ER for change worsening symptoms or new concerns. Patient and mother agreeable plan. CONSULTS:  None    PROCEDURES:  Unless otherwise notedbelow, none     Procedures    FINAL IMPRESSION     1. Upper respiratory tract infection, unspecified type    2. Abdominal pain, vomiting, and diarrhea    3. Urinary tract infection without hematuria, site unspecified    4. Possible Colitis          DISPOSITION/PLAN   DISPOSITION Decision To Discharge 07/17/2022 06:57:00 AM      PATIENT REFERRED TO:  @FUP@    DISCHARGE MEDICATIONS:  Discharge Medication List as of 7/17/2022  7:41 AM        START taking these medications    Details   ondansetron (ZOFRAN ODT) 4 MG disintegrating tablet Take 1 tablet by mouth every 8 hours as needed for Nausea or Vomiting, Disp-10 tablet, R-0Normal      levoFLOXacin (LEVAQUIN) 750 MG tablet Take 1 tablet by mouth in the morning for 7 days. , Disp-7 tablet, R-0Normal      metroNIDAZOLE (FLAGYL) 500 MG tablet Take 1 tablet by mouth in the morning and 1 tablet at noon and 1 tablet before bedtime. Do all this for 7 days. , Disp-21 tablet, R-0Normal      fluconazole (DIFLUCAN) 150 MG tablet Take 1 tablet by mouth once for 1 dose, Disp-1 tablet, R-0Normal                (Please note that portions of this note were completed with a voice recognition program.  Efforts were made to edit the dictations butoccasionally words are mis-transcribed.)    Aissatou Alejandro MD (electronically signed)  AttendingEmergency Physician          Carmen Navarro MD  07/18/22 9170

## 2022-07-17 NOTE — ED NOTES
Attempted to swab patient for covid. Pt and family states that pt would do better if she could take her Hydroxyzine. Spoke with Dr. Krupa Ruiz.  Per Dr. Krupa Ruiz pt okay to take home medication prior to covid test.      Cabrera Gay RN  07/17/22 0508

## 2022-07-19 LAB — URINE CULTURE, ROUTINE: NORMAL

## 2022-08-03 ENCOUNTER — HOSPITAL ENCOUNTER (OUTPATIENT)
Dept: PHYSICAL THERAPY | Age: 22
Setting detail: THERAPIES SERIES
Discharge: HOME | End: 2022-08-03
Payer: MEDICARE

## 2022-08-03 PROCEDURE — 97162 PT EVAL MOD COMPLEX 30 MIN: CPT

## 2022-08-03 PROCEDURE — 97110 THERAPEUTIC EXERCISES: CPT

## 2022-08-03 NOTE — PROGRESS NOTES
Physical Therapy  Initial Assessment  Date: 8/3/2022  Patient Name: Daiana Davila  MRN: [id number]  : 2000    Referring Physician: Amparo Sprague, [facility]   PCP: Faye Avila DO     Medical Diagnosis: Cervicalgia [M54.2] Cervicalgia  Treatment Diagnosis: Cervicalgia      Insurance: Payor: MEDICARE / Plan: MEDICARE PART A AND B / Product Type: *No Product type* /   Insurance ID: [id number] - (Medicare)      Restrictions:       Subjective:   General  Chart Reviewed: Yes  Patient Assessed for Rehabilitation Services: Yes  Additional Pertinent Hx: 24year old female has been referred to PT with diagnosis of cervicalgia. She relates long history of neck pain. She has a vision problem which requires cervical rotation to left for her accommodation. She feels her condition is worse at times. Self reported health status[de-identified] Good  History obtained from[de-identified] Patient  Family/Caregiver Present: Yes  Diagnosis: Cervicalgia  Referring Provider (secondary): Faye Avila  Follows Commands: Within Functional Limits  PT Visit Information  PT Insurance Information: 1. Medicare [address]          [id number]      Ref#:  [id number]   Contact: Debby Martinez  Total # of Visits to Date: 1  Plan of Care/Certification Expiration Date: 22  Progress Note Due Date: 22  Subjective  Subjective: She has intermttent neck and upper back pain. She has severe headache, frequency is decreasing.   Previous treatments prior to current episode?: Medications  Dominant Hand: : Right  Pain Screening  Patient Currently in Pain: Yes       Vision/Hearing:       Orientation:  Orientation  Follows Commands: Within Functional Limits    Social History:  Social History  Lives With: Family    Functional Status:       Objective:        Outpatient Rehab Objectives (Peds):    Cervical Assessment     AROM Cervical Spine   Cervical Spine AROM : WNL  Cervical left lateral: 56  Cervical right rotation: 40           Neuro Screen (Peds): Outcome Measure(s) Completed (Peds):       Treatments Completed:  Exercises:      Treatment Reasoning    Exercise 1: Pulleys, 3-5 min  Exercise 2: Supine or sitting with EC, cervical rot to right, 10 reps  Exercise 3: Sitting, shoulder rows, cw/ccw. 10 reps  Exercise 4: Sitting shoulder shrugs  Exercise 5: Sitting, cervical isometrics, 5 reps, 5 sec hold  Exercise 6: Standing, multifidus row, Paloff press, shoulder retraction, red tband, 10 reps. Exercise 7: Sitting on stool, fixed gaze, trunk rotation to left, 10 reps  Exercise 8: UBE, backward, 3-5 min  Exercise 11: IFC with MH as needed. Limitations addressed: Mobility, Strength, Posture, Pain modulation, Activity tolerance  Therapist provided: Assistance, Verbal cuing                     Assessment:    Conditions Requiring Skilled Therapeutic Intervention  Body Structures, Functions, Activity Limitations Requiring Skilled Therapeutic Intervention: Decreased functional mobility ; Decreased ADL status; Decreased ROM; Decreased body mechanics; Decreased high-level IADLs; Increased pain;Decreased posture  Assessment: Ms. Radha Ryees is seen today for increased neck pain. Her mother is present. She is pleasant and cooperative. She demonstrates the following physical therapy related body sturcture, function, activity limitaitons: 1. Decreased cervical rotation to right. 2. Postural faults. 3. Vision disorder with cervical compensation. She requires considerable verbal cuing for appropriate movements due to learning disability. She and mother are aware of PT risk/benefit and are anxious to continue PT.   Therapy Prognosis: Good  Treatment Diagnosis: Cervicalgia  Referring Provider (secondary): Faye Avila         Plan:    Plan  Plan weeks: 6-8 weeks  Current Treatment Recommendations: Strengthening, ROM, Functional mobility training, ADL/Self-care training, IADL training, Manual Therapy - Joint Manipulation, Manual Therapy - Soft Tissue Mobilization, Neuromuscular re-education, Pain management, Home exercise program, Patient/Caregiver education & training, Therapeutic activities, Positioning, Modalities    G-Code:       Balance and Gait:  Not Assessed    OutComes Score:  Neck Disability Index Raw Score: 10 (08/03/22 1233)    AM-PAC Score:   Goals:  Short Term Goals  Time Frame for Short term goals: 3-4 weeks  Short term goal 1: Patient will increased cervical rotation to right by 5 degrees to 45 degrees in order to approximate normal standing posture. Short term goal 2: Patient will improve ADL to 10% disability using the Neck Pain disability index. Short term goal 3: Patient/caregiver will  perform HEP with min cuing. Long Term Goals  Time Frame for Long term goals : 6-8 weeks  Long term goal 1: Patient will increased cervical rotation to right by 10 degrees to 50 degrees in order to approximate normal standing posture. Long term goal 2: Patient will improve ADL to <10% disability using the Neck Pain disability index. Long term goal 3: Patient/caregiver will  perform HEP with no cuing.   Patient Goals   Patient goals : Less neck pain       Therapy Time:   Individual Concurrent Group Co-treatment   Time In 1100         Time Out 1200         Minutes 60         Timed Code Treatment Minutes: Uma Melo [address] [place] [address]

## 2022-08-09 ENCOUNTER — APPOINTMENT (OUTPATIENT)
Dept: PHYSICAL THERAPY | Age: 22
End: 2022-08-09
Payer: MEDICARE

## 2022-08-11 ENCOUNTER — HOSPITAL ENCOUNTER (OUTPATIENT)
Dept: PHYSICAL THERAPY | Age: 22
Setting detail: THERAPIES SERIES
Discharge: HOME | End: 2022-08-11
Payer: MEDICARE

## 2022-08-11 PROCEDURE — 97110 THERAPEUTIC EXERCISES: CPT

## 2022-08-11 PROCEDURE — G0283 ELEC STIM OTHER THAN WOUND: HCPCS

## 2022-08-11 RX ORDER — SERTRALINE HYDROCHLORIDE 25 MG/1
TABLET, FILM COATED ORAL
Qty: 30 TABLET | Refills: 1 | OUTPATIENT
Start: 2022-08-11

## 2022-08-11 ASSESSMENT — PAIN DESCRIPTION - ORIENTATION: ORIENTATION: MID;UPPER;POSTERIOR

## 2022-08-11 ASSESSMENT — PAIN SCALES - GENERAL: PAINLEVEL_OUTOF10: 4

## 2022-08-11 ASSESSMENT — PAIN DESCRIPTION - PAIN TYPE: TYPE: CHRONIC PAIN

## 2022-08-11 ASSESSMENT — PAIN DESCRIPTION - LOCATION: LOCATION: BACK;NECK
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
## 2022-08-24 NOTE — PROGRESS NOTES
Physical Therapy  [facility]  OUTPATIENT PHYSICAL THERAPY  DISCHARGE SUMMARY    Date: 2022  Patient Name: Hussain Durant        MRN: [id number]    ACCOUNT #: [de-identified]  : 2000  (24 y.o.)  Gender: female      Diagnosis: Cervicalgia     Treatment Diagnosis: Cervicalgia    Total # of Visits Approved:  (12-16)  Total # of Visits to Date: 2    Subjective   Subjective: Patient with c/o neck and mid to upper back pain  Patient Currently in Pain: Yes  Pain Level: 4  Pain Type: Chronic pain  Pain Location: Back, Neck  Pain Orientation: Mid, Upper, Posterior     Additional Pertinent Hx: 24year old female has been referred to PT with diagnosis of cervicalgia. She relates long history of neck pain. She has a vision problem which requires cervical rotation to left for her accommodation. She feels her condition is worse at times. Objective  Treatments received include  Treatment Activities:        Exercises:      Treatment Reasoning    Exercise 1: Pulleys, 3-5 min  :  4 min  Exercise 2: Supine or sitting with EC, cervical rot to right,  5 sec x 10 reps  Exercise 3: Sitting, shoulder rows, cw/ccw. 10 reps  Exercise 4: Sitting shoulder shrugs  x 15  Exercise 5: Sitting, cervical isometrics, 5 reps, 5 sec hold  therapist providing resistance  Exercise 6: Standing, multifidus row, Paloff press, shoulder retraction, red tband, 10 reps. Exercise 7: Sitting on stool, fixed gaze, trunk rotation to left, 10 reps  Exercise 8: UBE, backward, 3-5 min  :  3 min  Exercise 11: IFC with MH as needed. x 20'                      See objective/subjective data in goals    Assessment  Ms. Cori Reyes was seen twice for PT. She relates she is moving out of town  Assessment: Initiated POC per initial evaluation, she required significant verbal cues and explaination due to her learning disability but was able to go through the routine with some increase in pain.  Estim performed at end of session but she was

## 2022-08-26 NOTE — TELEPHONE ENCOUNTER
Josh Palacios called to request a refill on her medication. Last office visit : 10/6/2021 with Gila SAXENA  Next office visit : 1/21/2022 with Gila SAXENA    Requested Prescriptions     Pending Prescriptions Disp Refills    traZODone (DESYREL) 50 MG tablet [Pharmacy Med Name: TRAZODONE HCL 50 MG TABS 50 Tablet] 30 tablet 2     Sig: TAKE 1 TABLET BY MOUTH NIGHTLY            Camelia Toscano RN        10/6/21                                               Progress Note     Josh Palacios 2000                                 Chief Complaint   Patient presents with    Medication Check    Follow-up            Subjective:    Patient is a 24 y.o. female diagnosed with mood disorder and presents today for follow-up. Last seen in clinic on 9/8/2021  and prior records were reviewed.     Last visit: Doing good today. She had covid and is recovering. She feels like she got it from her boyfriend, she feels like he picked it up from work. She is going to get vaccinated after quarantine. Pt states she still has difficulty taking her medication. States she is still having anger outbursts just about once per day. Her mother was present on speaker phone today during interview, mother stated that her anxiety outbursts are getting more aggressive. We discussed a Lamictal titration schedule. Patient also had complaints of difficulty falling asleep due to increased stress and anxiety. Patient stated anxiety is perpetuated by finances. We discussed beginning trazodone 50 mg nightly for insomnia. Mother stated that she is still having difficulty remembering to take her medications daily even after being reminded. Patient states barrier to this is that she forgets if she takes her medication and does not want to take more than she is supposed to. She sounds upbeat and pleasant on the phone denies side effects from medications at this time denies SI HI AVH.     Today:  Doing good.   Quite a bit Pt informed she can take allergy medication up to midnight prior to surgery   less temper tantrums and outbursts. Sleeping a lot a better also. Taking her medication a lot more consistently she has started setting alarms on her phone to take her medications and doing them immediately. Sounds tired on the phone. States that she just woke up from a nap. She reports that family and boyfriend have noticed that she is doing much better. She has a little bit of stress because that her therapist is leaving so she is trying to find a new therapist that is in network. She denies SI HI AVH at this time she is calm and cooperative on the phone she denies side effects of medications. We discussed the importance of medication adherence as well as sleep hygiene and patient verbalized understanding. She reports that she is recovering well from [place] she sounds to have a bit of a stuffy nose on the phone but she states that it is from allergies.           Absent  suicidal ideation. Reports compliance with medications as good .      Sleep: better. She is doing better. Not clinching her hands as much.   Some nightmares when she forgets to take her pills, difficulty falling asleep at times.     Caffeine use: no change     PREVIOUS MED TRIALS  celexa 40 mg  abilify 10     Current Substance Use:   Alcohol: none  Illicit drug use: denies   Marijuana: denies   Tobacco: denies   Vape: denies      BP: There were no vitals taken for this visit.        Review of Systems - 14 point review:  Negative      Constitutional: (fevers, chills, night sweats, wt loss/gain, change in appetite, fatigue, somnolence)     HEENT: (ear pain or discharge, hearing loss, ear ringing, sinus pressure, nosebleed, nasal discharge, sore throat, oral sores, tooth pain, bleeding gums, hoarse voice, neck pain)      Cardiovascular: (HTN, chest pain, elevated cholesterol/lipids, palpitations, leg swelling, leg pain with walking)     Respiratory: (cough, wheezing, snoring, SOB with activity (dyspnea), SOB while lying flat (orthopnea), awakening with severe SOB (paroxysmal nocturnal dyspnea))     Gastrointestinal: (NVD, constipation, abdominal pain, bright red stools, black tarry stools, stool incontinence)     Genitourinary:  (pelvic pain, burning or frequency of urination, urinary urgency, blood in urine incomplete bladder emptying, urinary incontinence, STD; MEN: testicular pain or swelling, erectile dysfunction; WOMEN: LMP, heavy menstrual bleeding (menorrhagia), irregular periods, postmenopausal bleeding, menstrual pain (dymenorrhea, vaginal discharge)     Musculoskeletal: (bone pain/fracture, joint pain or swelling, musle pain)     Integumentary: (rashes, acne, non-healing sores, itching, breast lumps, breast pain, nipple discharge, hair loss)     Neurologic: (HA, muscle weakness, paresthesias (numbness, coldness, crawling or prickling), memory loss, seizure, dizziness)     Psychiatric:  (anxiety, sadness, irritability/anger, insomnia, suicidality)     Endocrine: (heat or cold intolerance, excessive thirst (polydipsia), excessive hunger (polyphagia))     Immune/Allergic: (hives, seasonal or environmental allergies, HIV exposure)     Hematologic/Lymphatic: (lymph node enlargement, easy bleeding or bruising)     History obtained via chart review and patient     PCP is Severiano Hose, MD         Current Meds:     Home Medications   Prior to Admission medications    Medication Sig Start Date End Date Taking?  Authorizing Provider   hydrOXYzine (ATARAX) 25 MG tablet TAKE 1 TABLET BY MOUTH 3 TIMES DAILY AS NEEDED FOR ANXIETY 10/4/21 11/3/21   ODESSA Claros CNP   lamoTRIgine (LAMICTAL) 150 MG tablet TAKE 0.5 TABLETS BY MOUTH DAILY 10/4/21     ODESSA Claros CNP   prazosin (MINIPRESS) 1 MG capsule TAKE 1 CAPSULE BY MOUTH NIGHTLY 10/4/21     ODESSA Claros CNP   traZODone (DESYREL) 50 MG tablet Take 1 tablet by mouth nightly 9/8/21     ODESSA Claros CNP   omeprazole (PRILOSEC) 20 MG delayed release capsule TAKE ONE CAPSULE BY MOUTH EVERY DAY FOR REFLUX 4/15/21     Historical Provider, MD   norethindrone-ethinyl estradiol (JUNEL FE 1/20) 1-20 MG-MCG per tablet Take 1 tablet by mouth daily       Historical Provider, MD   metoclopramide (REGLAN) 10 MG tablet Take 1 tablet by mouth every 6 hours as needed (headache, nausea or vomiting) May cause drowsiness or diarrhea. 2/1/21     Carolyn Mcintosh MD         Social History   Social History            Socioeconomic History    Marital status: Single       Spouse name: Not on file    Number of children: Not on file    Years of education: Not on file    Highest education level: Not on file   Occupational History    Not on file   Tobacco Use    Smoking status: Never Smoker    Smokeless tobacco: Never Used   Substance and Sexual Activity    Alcohol use: Never    Drug use: Never    Sexual activity: Never   Other Topics Concern    Not on file   Social History Narrative     SAFETY PLAN (completed 5/3/21)           A suicide Safety Plan is a document that supports someone when they are having thoughts of suicide.           Warning Signs that indicate a suicidal crisis may be developing: What (situations, thoughts, feelings, body sensations, behaviors, etc.) do you experience that lets you know you are beginning to think about suicide?     1. Increased feelings anxiety     2. Running fingers/hands through hair     3. Will be more quiet           Internal Coping Strategies:  What things can I do (relaxation techniques, hobbies, physical activities, etc.) to take my mind off my problems without contacting another person?     1. Writing poetry     2. Reading Fanfic     3. Watching TV           People and social settings that provide distraction: Who can I call or where can I go to distract me?     1. Name: Ruthann Chinyere, boyfriend                      Phone: In phone     2. Name: Suzanne Uribe, close friend                   Phone: In phone      3.  Place: None currently               4. Place:         People whom I can ask for help: Who can I call when I need help - for example, friends, family, clergy, someone else?     1. Name: Alejandra Espinoza boyfriend                           Phone: In phone     2. Name: Mother, Huong Fletcher                            Phone: In phone     3. Name: Close friend, Annalise Richter                      Phone: In phone           Professionals or [address] I can contact during a crisis: Who can I call for help - for example, my doctor, my psychiatrist, my psychologist, a mental health provider, a suicide hotline?     1. Clinician Name: [id number] [facility] Outpatient office  Phone: [phone] Option 3         Clinician Pager or Emergency Contact #: [phone]           2. Clinician Name: Enoch Ortiz MD Phone: [phone]         Clinician Pager or Emergency Contact #: [phone]           3. Suicide Prevention Lifeline: 2-870-711-TALK (0403)           4. [address] [place] [address] Emergency Services -  for example, [address], [facility] suicide hotline, [facility] Hotline:                  [address]: Office number: [phone]         Emergency Services Address: Danelle Reeves [address] [place] [address]         Emergency Services Phone: CRISIS LINE: [phone]           Making the environment safe: How can I make my environment (house/apartment/living space) safer? For example, can I remove guns, medications, and other items?     Remove any guns/weapons from home.      2. Remove or lock up extra medications in a secure location      Social Determinants of Health          Financial Resource Strain:     Difficulty of Paying Living Expenses:    Food Insecurity:     Worried About Running Out of Food in the Last Year:     [address] in the Last Year:    Transportation Needs:     Lack of Transportation (Medical):      Lack of Transportation (Non-Medical):    Physical Activity:     Days of Exercise per Week:     Minutes of Exercise per Session:    Stress:     Feeling of Stress :    Social Connections:     Frequency of Communication with Friends and Family:     Frequency of Social Gatherings with Friends and Family:     Attends Mu-ism Services:     Active Member of Clubs or Organizations:     Attends Club or Organization Meetings:     Marital Status:    Intimate Partner Violence:     Fear of Current or Ex-Partner:     Emotionally Abused:     Physically Abused:     Sexually Abused:             MSE:  Appearance:  UTO due to phone call  Behavior: Calm, cooperative, and socially appropriate. Speech: Normal in tone, volume, and quality. No slurring, dysarthria or pressured speech noted. Mood: \"so much better\"   Affect: UTO due to phonecall  Thought Process: Appears linear, logical and goal oriented. Causality appears intact. Thought Content: Denies active suicidal and homicidal ideations. No overt delusions or paranoia appreciated. Perceptions: Denies auditory or visual hallucinations at present time. Not responding to internal stimuli. Concentration: Intact. Orientation: to person, place, date, and situation. Language: Intact. Fund of information: Intact. Memory: Recent and remote appear intact. Impulsivity: Limited. Neurovegitative: Fair appetite and sleep. Insight: Fair. Judgment: Fair.     Cognition: Can spell \"world\" backwards: Yes                    Can do serial 7's:  Yes           Lab Results   Component Value Date      03/11/2019     K 3.9 03/11/2019      03/11/2019     CO2 26 03/11/2019     BUN 13 03/11/2019     CREATININE 0.7 03/11/2019     GLUCOSE 96 03/11/2019     CALCIUM 9.4 03/11/2019     PROT 8.0 03/11/2019     LABALBU 4.7 03/11/2019     BILITOT 0.5 03/11/2019     ALKPHOS 70 03/11/2019     AST 20 03/11/2019     ALT 20 03/11/2019     LABGLOM >60 03/11/2019            Lab Results   Component Value Date      03/11/2019     K 3.9 03/11/2019      03/11/2019     CO2 26 03/11/2019 the defined types were placed in this encounter.        9. Additional comments: Continue therapy, discussed sleep hygiene, discussed the use of coping skills and relaxation strategies to manage symptoms.      Trazodone- Discussed benefits, alternatives and risks involved with care, including - but not limited to - possible adverse effects of dizziness, hypotension, increased risk of falls w/ need to slowly transition between positions, excessive drowsiness, dry mouth, constipation or allergic reaction were discussed with the patient. Further discussed possibility of Serotonin Syndrome (sx including diaphoresis, agitation, muscle tension increase/rigidity, fever) with use of other serotonergic agents. Advised caution in operating vehicles/machinery after taking trazodone.     10. Over 50% of the total visit time of 12 minutes was spent on counseling and/or coordination of care of:                         1. Mood disorder ([facility]                        Psychotherapy Topics: mood/medication effectiveness family and health     ODESSA Gastelum - CNP

## 2022-09-08 ENCOUNTER — NURSE TRIAGE (OUTPATIENT)
Dept: CALL CENTER | Facility: HOSPITAL | Age: 22
End: 2022-09-08

## 2022-09-08 ENCOUNTER — HOSPITAL ENCOUNTER (EMERGENCY)
Facility: HOSPITAL | Age: 22
Discharge: HOME | End: 2022-09-08
Attending: STUDENT IN AN ORGANIZED HEALTH CARE EDUCATION/TRAINING PROGRAM | Admitting: STUDENT IN AN ORGANIZED HEALTH CARE EDUCATION/TRAINING PROGRAM

## 2022-09-08 VITALS
OXYGEN SATURATION: 97 % | WEIGHT: 150 LBS | HEIGHT: 62 IN | DIASTOLIC BLOOD PRESSURE: 96 MMHG | RESPIRATION RATE: 16 BRPM | HEART RATE: 97 BPM | BODY MASS INDEX: 27.6 KG/M2 | TEMPERATURE: 100.2 F | SYSTOLIC BLOOD PRESSURE: 141 MMHG

## 2022-09-08 DIAGNOSIS — R09.81 NASAL CONGESTION: ICD-10-CM

## 2022-09-08 DIAGNOSIS — R50.9 FEVER, UNSPECIFIED: ICD-10-CM

## 2022-09-08 DIAGNOSIS — U07.1 COVID-19 VIRUS INFECTION: Primary | ICD-10-CM

## 2022-09-08 DIAGNOSIS — J06.9 VIRAL UPPER RESPIRATORY TRACT INFECTION: ICD-10-CM

## 2022-09-08 LAB — SARS-COV-2 RNA PNL SPEC NAA+PROBE: DETECTED

## 2022-09-08 PROCEDURE — 99283 EMERGENCY DEPT VISIT LOW MDM: CPT

## 2022-09-08 PROCEDURE — 87635 SARS-COV-2 COVID-19 AMP PRB: CPT | Performed by: STUDENT IN AN ORGANIZED HEALTH CARE EDUCATION/TRAINING PROGRAM

## 2022-09-08 PROCEDURE — C9803 HOPD COVID-19 SPEC COLLECT: HCPCS | Performed by: STUDENT IN AN ORGANIZED HEALTH CARE EDUCATION/TRAINING PROGRAM

## 2022-09-08 RX ORDER — SERTRALINE HYDROCHLORIDE 25 MG/1
25 TABLET, FILM COATED ORAL DAILY
COMMUNITY

## 2022-09-08 RX ORDER — METOCLOPRAMIDE 10 MG/1
10 TABLET ORAL
COMMUNITY

## 2022-09-08 RX ORDER — PRAZOSIN HYDROCHLORIDE 1 MG/1
1 CAPSULE ORAL NIGHTLY
COMMUNITY

## 2022-09-08 RX ORDER — LAMOTRIGINE 100 MG/1
200 TABLET ORAL DAILY
COMMUNITY

## 2022-09-08 RX ORDER — HYDROXYZINE HYDROCHLORIDE 25 MG/1
25 TABLET, FILM COATED ORAL 3 TIMES DAILY PRN
COMMUNITY

## 2022-09-08 RX ORDER — ACETAMINOPHEN 500 MG
1000 TABLET ORAL ONCE
Status: COMPLETED | OUTPATIENT
Start: 2022-09-08 | End: 2022-09-08

## 2022-09-08 RX ADMIN — ACETAMINOPHEN 1000 MG: 500 TABLET ORAL at 00:37

## 2022-09-08 NOTE — DISCHARGE INSTRUCTIONS
Today you are seen for symptoms that are most consistent with a upper respiratory viral tract infection.  It is possible that you have COVID but you may also likely have another virus.  You can follow-up the results of your COVID testing on our basilio.  Please follow-up with your primary care fighter within 2 days for further management.  If your symptoms worsen prior please return to the emergency department.

## 2022-09-08 NOTE — TELEPHONE ENCOUNTER
Mom states daughter has COVID and asking what to monitor for? Mom states fever 101.9, runny nose with some chills. Mom advised to contact PCP office this morning as she was told per ER visit. Mom educated on what to monitor for such as shortness of breath, chest pain and high temperatures. Mom educated on when to seek emergent care. Mom states she has pulse ox and was educated on what to monitor for. Mom advised to call back as needed.     Reason for Disposition  • [1] COVID-19 diagnosed by positive lab test (e.g., PCR, rapid self-test kit) AND [2] mild symptoms (e.g., cough, fever, others) AND [3] no complications or SOB    Additional Information  • Negative: SEVERE difficulty breathing (e.g., struggling for each breath, speaks in single words)  • Negative: Difficult to awaken or acting confused (e.g., disoriented, slurred speech)  • Negative: Bluish (or gray) lips or face now  • Negative: Shock suspected (e.g., cold/pale/clammy skin, too weak to stand, low BP, rapid pulse)  • Negative: Sounds like a life-threatening emergency to the triager  • Negative: [1] Diagnosed or suspected COVID-19 AND [2] symptoms lasting 3 or more weeks  • Negative: [1] COVID-19 exposure AND [2] no symptoms  • Negative: COVID-19 vaccine reaction suspected (e.g., fever, headache, muscle aches) occurring 1 to 3 days after getting vaccine  • Negative: COVID-19 vaccine, questions about  • Negative: [1] Lives with someone known to have influenza (flu test positive) AND [2] flu-like symptoms (e.g., cough, runny nose, sore throat, SOB; with or without fever)  • Negative: [1] Adult with possible COVID-19 symptoms AND [2] triager concerned about severity of symptoms or other causes  • Negative: COVID-19 and breastfeeding, questions about  • Negative: SEVERE or constant chest pain or pressure  (Exception: Mild central chest pain, present only when coughing.)  • Negative: MODERATE difficulty breathing (e.g., speaks in phrases, SOB even at rest,  "pulse 100-120)  • Negative: [1] Headache AND [2] stiff neck (can't touch chin to chest)  • Negative: Oxygen level (e.g., pulse oximetry) 90 percent or lower  • Negative: Chest pain or pressure  • Negative: Patient sounds very sick or weak to the triager  • Negative: MILD difficulty breathing (e.g., minimal/no SOB at rest, SOB with walking, pulse <100)  • Negative: Fever > 103 F (39.4 C)  • Negative: [1] Fever > 101 F (38.3 C) AND [2] age > 60 years  • Negative: [1] Fever > 100.0 F (37.8 C) AND [2] bedridden (e.g., nursing home patient, CVA, chronic illness, recovering from surgery)  • Negative: HIGH RISK for severe COVID complications (e.g., weak immune system, age > 64 years, obesity with BMI > 25, pregnant, chronic lung disease or other chronic medical condition)  (Exception: Already seen by PCP and no new or worsening symptoms.)  • Negative: [1] HIGH RISK patient AND [2] influenza is widespread in the community AND [3] ONE OR MORE respiratory symptoms: cough, sore throat, runny or stuffy nose  • Negative: [1] HIGH RISK patient AND [2] influenza exposure within the last 7 days AND [3] ONE OR MORE respiratory symptoms: cough, sore throat, runny or stuffy nose  • Negative: Oxygen level (e.g., pulse oximetry) 91 to 94 percent  • Negative: Fever present > 3 days (72 hours)  • Negative: [1] Fever returns after gone for over 24 hours AND [2] symptoms worse or not improved  • Negative: [1] Continuous (nonstop) coughing interferes with work or school AND [2] no improvement using cough treatment per Care Advice  • Negative: [1] COVID-19 infection suspected by caller or triager AND [2] mild symptoms (cough, fever, or others) AND [3] negative COVID-19 rapid test  • Negative: Cough present > 3 weeks  • Negative: [1] COVID-19 diagnosed by positive lab test (e.g., PCR, rapid self-test kit) AND [2] NO symptoms (e.g., cough, fever, others)    Answer Assessment - Initial Assessment Questions  1. COVID-19 DIAGNOSIS: \"Who made your " "COVID-19 diagnosis?\" \"Was it confirmed by a positive lab test or self-test?\" If not diagnosed by a doctor (or NP/PA), ask \"Are there lots of cases (community spread) where you live?\" Note: See public health department website, if unsure.        2. COVID-19 EXPOSURE: \"Was there any known exposure to COVID before the symptoms began?\" CDC Definition of close contact: within 6 feet (2 meters) for a total of 15 minutes or more over a 24-hour period.         3. ONSET: \"When did the COVID-19 symptoms start?\"       Yesterday they are thinking   4. WORST SYMPTOM: \"What is your worst symptom?\" (e.g., cough, fever, shortness of breath, muscle aches)       Fever and chills   5. COUGH: \"Do you have a cough?\" If Yes, ask: \"How bad is the cough?\"         Denies   6. FEVER: \"Do you have a fever?\" If Yes, ask: \"What is your temperature, how was it measured, and when did it start?\"      Yes   7. RESPIRATORY STATUS: \"Describe your breathing?\" (e.g., shortness of breath, wheezing, unable to speak)       Denies   8. BETTER-SAME-WORSE: \"Are you getting better, staying the same or getting worse compared to yesterday?\"  If getting worse, ask, \"In what way?\"      Same   9. HIGH RISK DISEASE: \"Do you have any chronic medical problems?\" (e.g., asthma, heart or lung disease, weak immune system, obesity, etc.)      Denies   10. VACCINE: \"Have you had the COVID-19 vaccine?\" If Yes, ask: \"Which one, how many shots, when did you get it?\"        Denies   11. BOOSTER: \"Have you received your COVID-19 booster?\" If Yes, ask: \"Which one and when did you get it?\"        Denies   12. PREGNANCY: \"Is there any chance you are pregnant?\" \"When was your last menstrual period?\"        Not that I know of   13. OTHER SYMPTOMS: \"Do you have any other symptoms?\"  (e.g., chills, fatigue, headache, loss of smell or taste, muscle pain, sore throat)        Fever, Chills and runny nose  14. O2 SATURATION MONITOR:  \"Do you use an oxygen saturation monitor (pulse " "oximeter) at home?\" If Yes, ask \"What is your reading (oxygen level) today?\" \"What is your usual oxygen saturation reading?\" (e.g., 95%)    Protocols used: CORONAVIRUS (COVID-19) DIAGNOSED OR SUSPECTED-ADULT-AH      "

## 2022-09-08 NOTE — ED PROVIDER NOTES
EMERGENCY DEPARTMENT HISTORY AND PHYSICAL EXAM    Patient Name: Cesia Silva    Chief Complaint   Patient presents with   • Exposure To Known Illness   • Fever       History of Presenting Illness:  Cesia Silva is a 21 y.o. female who presents emergency department due to concerns for COVID infection after recent positive exposure.    Patient states that about 2 days ago she was exposed to someone with COVID.  Today she started having some nasal congestion as well as some fevers at home.  Has not taken any Tylenol or ibuprofen.  Otherwise denies any shortness of breath or chest pain.  Has had some nausea but no vomiting episodes also no diarrhea.  She is on a Depo Provera injection to prevent.  States she does not have regular periods.    Past Medical History:   Irregular periods    Past Surgical History:   Denies prior surgeries    Social History:   Denies tobacco  Denies EtOH  Denies marijuana, cocaine, or IV drugs    Allergies:   No Known Allergies    Medications:  No current facility-administered medications for this encounter.    Current Outpatient Medications:   •  hydrOXYzine (ATARAX) 25 MG tablet, Take 25 mg by mouth 3 (Three) Times a Day As Needed for Itching., Disp: , Rfl:   •  lamoTRIgine (LaMICtal) 100 MG tablet, Take 200 mg by mouth Daily., Disp: , Rfl:   •  metoclopramide (REGLAN) 10 MG tablet, Take 10 mg by mouth 4 (Four) Times a Day Before Meals & at Bedtime., Disp: , Rfl:   •  sertraline (ZOLOFT) 25 MG tablet, Take 25 mg by mouth Daily., Disp: , Rfl:   •  fluticasone (FLONASE) 50 MCG/ACT nasal spray, 2 sprays into the nostril(s) as directed by provider Daily., Disp: 15.8 mL, Rfl: 0  •  prazosin (MINIPRESS) 1 MG capsule, Take 1 mg by mouth Every Night., Disp: , Rfl:     Review of Systems:  A full review of systems was obtained and is negative unless otherwise stated in HPI.    Physical Exam:  VS: /96 (BP Location: Right arm, Patient Position: Sitting)   Pulse 97   Temp 100.2  "°F (37.9 °C) (Oral)   Resp 16   Ht 157.5 cm (62\")   Wt 68 kg (150 lb)   SpO2 97%   BMI 27.44 kg/m²   GENERAL: Well-appearing young woman sitting up in chair no acute distress holding a stuffed animal; well nourished, well developed, awake, alert, no acute distress, nontoxic appearing, comfortable  EYES: PERRL, sclera anicteric, extra-occular movements grossly intact, symmetric lids  EARS, NOSE, MOUTH, THROAT: atraumatic external nose and ears, moist mucous membranes  NECK: Symmetric, trachea midline, no thyromegaly, no adenopathy, no meningismus  RESPIRATORY: Unlabored respiratory effort, clear to auscultation bilaterally, good air movement  CARDIOVASCULAR: No murmurs or gallops, peripheral pulses 2+ and equal in all extremities  GI: Soft, nontender, nondistended, bowel sounds present, no hepatosplenomegaly  LYMPHATIC: no lymphadenopathy  MUSCULOSKELETAL/EXTREMITIES: Extremities without obvious deformity, no cyanosis or clubbing  SKIN: warm and dry with no obvious rashes  NEUROLOGIC: moving all 4 extremities symmetrically, CN II-XII grossly intact  PSYCHIATRIC: alert, pleasant and cooperative. Appropriate mood and affect.      Labs:  Labs Reviewed   COVID-19,[facility] NO TRANSPORT MEDIA 2 HR TAT - Abnormal; Notable for the following components:       Result Value    COVID19 Detected (*)     All other components within normal limits    Narrative:     Fact sheet for providers: https://www.fda.gov/media/208003/download     Fact sheet for patients: https://www.fda.gov/media/941218/download    Test performed by PCR.    Consider negative results in combination with clinical observations, patient history, and epidemiological information.         Medical Decision Making:  Cesia Silva is a 21 y.o. female with no significant past medical who presents emerged part after recent COVID exposure with concerns for COVID infection in setting of her symptoms.    Patient was elevated temperature to " 100.2 Fahrenheit on arrival.  Otherwise reassuring vital signs no hypoxia no tachypnea.    Labs were ordered and reviewed.  Positive COVID testing.    Nursing notes were reviewed.    The patient was given Tylenol for fever.    Patient's presentation is most consistent with upper respiratory viral tract infection and COVID infection given positive testing.  Would consider other viral such as enterovirus or rhinovirus but given this result this is most likely.  Patient with no sniffing symptoms or shortness of breath doubt bacterial pneumonia given this as well as her reassuring vital signs with no hypoxia no tachypnea.     Patient was discharged home with pending COVID result with plan to follow-up via our basilio for result.  I instructed her to follow-up with her primary care fighter within 2 days for further management given return precautions for worsening symptoms if her COVID testing is positive as well as worsening symptoms if she simply has upper respiratory viral tract infection with another virus.      ED Diagnosis:  Nasal congestion; Fever, unspecified; Viral upper respiratory tract infection      Disposition: to home    Follow up plan: PCP follow up within 2 days, return to ED immediately if symptoms worsen        Signed:  Andrew Vazquez MD  Emergency Medicine Physician    Please note that portions of this note were completed with a voice recognition program.      Andrew Vazquez MD  09/08/22 [id number]